# Patient Record
Sex: FEMALE | Race: BLACK OR AFRICAN AMERICAN | NOT HISPANIC OR LATINO | Employment: OTHER | URBAN - METROPOLITAN AREA
[De-identification: names, ages, dates, MRNs, and addresses within clinical notes are randomized per-mention and may not be internally consistent; named-entity substitution may affect disease eponyms.]

---

## 2018-01-05 ENCOUNTER — TELEPHONE (OUTPATIENT)
Dept: TRANSPLANT | Facility: CLINIC | Age: 63
End: 2018-01-05

## 2018-01-05 NOTE — TELEPHONE ENCOUNTER
"----- Message from Tracy Gonzalez sent at 1/5/2018  3:44 PM CST -----  We have the pt recorders and they are now pending review by the referral nurse. Also updated her demo info. Pt ls request that we not call her with appt, "just anju'ed them and send it in the mail" but will need atleast a 2wk notice    By:Tracy Gonzalez    "

## 2018-01-11 ENCOUNTER — TELEPHONE (OUTPATIENT)
Dept: TRANSPLANT | Facility: CLINIC | Age: 63
End: 2018-01-11

## 2018-01-11 NOTE — LETTER
January 11, 2018    Glory Chawla  [address] [address]  [place] [address]      Dear Glory Chawla:    Your doctor has referred you to the [facility]. You will be contacted by our office and an initial appointment will then be scheduled for you.    We look forward to seeing you soon. If you have any further questions, please contact us at [phone].       Sincerely,        [facility]   [facility] [place] [address]  [phone]

## 2018-01-11 NOTE — TELEPHONE ENCOUNTER
Pt records reviewed.  Pt will be referred to Hepatology due to cirrhosis with small 11 mm x 8mm. compensated cirrhosis, low MELD, CP A, secondary to Hep C (s/p SVR with Harvoni) with a new small HCC (8 x 11mm).   Initial referral received  from  Gene Coleman's  office.   Referral letter sent to provider and patient.  Pt records reviewed.    Emailed referring re need for CD and lvm with pt for CD to be brought to clinic.

## 2018-02-09 ENCOUNTER — LAB VISIT (OUTPATIENT)
Dept: LAB | Facility: HOSPITAL | Age: 63
End: 2018-02-09
Attending: INTERNAL MEDICINE
Payer: MEDICAID

## 2018-02-09 ENCOUNTER — OFFICE VISIT (OUTPATIENT)
Dept: HEPATOLOGY | Facility: CLINIC | Age: 63
End: 2018-02-09
Payer: MEDICAID

## 2018-02-09 VITALS
DIASTOLIC BLOOD PRESSURE: 78 MMHG | RESPIRATION RATE: 18 BRPM | HEIGHT: 63 IN | OXYGEN SATURATION: 95 % | TEMPERATURE: 96 F | SYSTOLIC BLOOD PRESSURE: 149 MMHG | WEIGHT: 167.13 LBS | HEART RATE: 84 BPM | BODY MASS INDEX: 29.61 KG/M2

## 2018-02-09 DIAGNOSIS — C22.0 HCC (HEPATOCELLULAR CARCINOMA): ICD-10-CM

## 2018-02-09 DIAGNOSIS — R16.0 LIVER MASS: ICD-10-CM

## 2018-02-09 DIAGNOSIS — K74.69 COMPENSATED CIRRHOSIS RELATED TO HEPATITIS C VIRUS (HCV): ICD-10-CM

## 2018-02-09 DIAGNOSIS — B19.20 COMPENSATED CIRRHOSIS RELATED TO HEPATITIS C VIRUS (HCV): Primary | ICD-10-CM

## 2018-02-09 DIAGNOSIS — K74.69 COMPENSATED CIRRHOSIS RELATED TO HEPATITIS C VIRUS (HCV): Primary | ICD-10-CM

## 2018-02-09 DIAGNOSIS — B19.20 COMPENSATED CIRRHOSIS RELATED TO HEPATITIS C VIRUS (HCV): ICD-10-CM

## 2018-02-09 LAB
AFP SERPL-MCNC: 9 NG/ML
ALBUMIN SERPL BCP-MCNC: 3.5 G/DL
ALP SERPL-CCNC: 91 U/L
ALT SERPL W/O P-5'-P-CCNC: 65 U/L
ANION GAP SERPL CALC-SCNC: 8 MMOL/L
AST SERPL-CCNC: 68 U/L
BILIRUB SERPL-MCNC: 0.2 MG/DL
BUN SERPL-MCNC: 21 MG/DL
CALCIUM SERPL-MCNC: 9.5 MG/DL
CHLORIDE SERPL-SCNC: 105 MMOL/L
CO2 SERPL-SCNC: 26 MMOL/L
CREAT SERPL-MCNC: 1 MG/DL
ERYTHROCYTE [DISTWIDTH] IN BLOOD BY AUTOMATED COUNT: 13.2 %
EST. GFR  (AFRICAN AMERICAN): >60 ML/MIN/1.73 M^2
EST. GFR  (NON AFRICAN AMERICAN): >60 ML/MIN/1.73 M^2
GLUCOSE SERPL-MCNC: 91 MG/DL
HCT VFR BLD AUTO: 35.9 %
HGB BLD-MCNC: 12.5 G/DL
INR PPP: 1.1
MCH RBC QN AUTO: 34.7 PG
MCHC RBC AUTO-ENTMCNC: 34.8 G/DL
MCV RBC AUTO: 100 FL
PLATELET # BLD AUTO: 114 K/UL
PMV BLD AUTO: 11.9 FL
POTASSIUM SERPL-SCNC: 4.4 MMOL/L
PROT SERPL-MCNC: 7.8 G/DL
PROTHROMBIN TIME: 11.5 SEC
RBC # BLD AUTO: 3.6 M/UL
SODIUM SERPL-SCNC: 139 MMOL/L
WBC # BLD AUTO: 5.82 K/UL

## 2018-02-09 PROCEDURE — 3008F BODY MASS INDEX DOCD: CPT | Mod: ,,, | Performed by: INTERNAL MEDICINE

## 2018-02-09 PROCEDURE — 99214 OFFICE O/P EST MOD 30 MIN: CPT | Mod: PBBFAC | Performed by: INTERNAL MEDICINE

## 2018-02-09 PROCEDURE — 80321 ALCOHOLS BIOMARKERS 1OR 2: CPT

## 2018-02-09 PROCEDURE — 82105 ALPHA-FETOPROTEIN SERUM: CPT

## 2018-02-09 PROCEDURE — 85610 PROTHROMBIN TIME: CPT

## 2018-02-09 PROCEDURE — 87340 HEPATITIS B SURFACE AG IA: CPT

## 2018-02-09 PROCEDURE — 99204 OFFICE O/P NEW MOD 45 MIN: CPT | Mod: S$PBB,,, | Performed by: INTERNAL MEDICINE

## 2018-02-09 PROCEDURE — 87536 HIV-1 QUANT&REVRSE TRNSCRPJ: CPT

## 2018-02-09 PROCEDURE — 86706 HEP B SURFACE ANTIBODY: CPT

## 2018-02-09 PROCEDURE — 80053 COMPREHEN METABOLIC PANEL: CPT

## 2018-02-09 PROCEDURE — 87522 HEPATITIS C REVRS TRNSCRPJ: CPT

## 2018-02-09 PROCEDURE — 36415 COLL VENOUS BLD VENIPUNCTURE: CPT

## 2018-02-09 PROCEDURE — 86803 HEPATITIS C AB TEST: CPT

## 2018-02-09 PROCEDURE — 85027 COMPLETE CBC AUTOMATED: CPT

## 2018-02-09 PROCEDURE — 99999 PR PBB SHADOW E&M-EST. PATIENT-LVL IV: CPT | Mod: PBBFAC,,, | Performed by: INTERNAL MEDICINE

## 2018-02-09 RX ORDER — DOLUTEGRAVIR SODIUM 50 MG/1
50 TABLET, FILM COATED ORAL DAILY
Refills: 5 | COMMUNITY
Start: 2018-02-06

## 2018-02-09 RX ORDER — LANSOPRAZOLE 15 MG/1
15 CAPSULE, DELAYED RELEASE ORAL DAILY
COMMUNITY
Start: 2018-02-08 | End: 2020-05-27

## 2018-02-09 RX ORDER — EMTRICITABINE AND TENOFOVIR ALAFENAMIDE 200; 25 MG/1; MG/1
200 TABLET ORAL DAILY
Refills: 5 | Status: ON HOLD | COMMUNITY
Start: 2018-02-06 | End: 2018-04-06 | Stop reason: ALTCHOICE

## 2018-02-09 RX ORDER — HYDROCODONE BITARTRATE AND ACETAMINOPHEN 7.5; 325 MG/1; MG/1
7.5 TABLET ORAL
COMMUNITY
Start: 2018-01-29 | End: 2019-01-04 | Stop reason: ALTCHOICE

## 2018-02-09 NOTE — PATIENT INSTRUCTIONS
- will schedule blood tests today  - will schedule MRI of liver   - then Dr. Whitley will review your MRI at Radiology Conference and call you with the result  - please stop alcohol completely

## 2018-02-09 NOTE — LETTER
February 9, 2018      Virginia Mills MD  [address]  [facility] [address]           [facility] - Hepatology  [address]  [facility] [address]  Phone: [phone]  Fax: [phone]          Patient: Glory Chawla   MR Number: [id number]   YOB: 1955   Date of Visit: 2/9/2018       Dear Dr. Virginia Mills:    Thank you for referring Glory Chawla to me for evaluation. Attached you will find relevant portions of my assessment and plan of care.     62 y.o. female presenting with     1. Compensated cirrhosis related to hepatitis C virus (HCV)    2. Liver mass    3. HCC (hepatocellular carcinoma)      HIV+ patient on ARV, HCV - treated with SOF/LDV in 2014 and SVR since.  Compensated cirrhosis.  1 cm lesion in the liver - [facility] - HCC per patient (biopsy report n/a). CD for outside images: n/a.  Last imaging was in Oct 2017.  If HCC is 1 cm, ablation would be a curative, least invasive option.    Recommendation(s):  - repeat labs and imaging CT 3-phase  - recommend to stop drinking alcohol completely  - will review images at Liver Imaging Conference    If you have questions, please do not hesitate to call me. I look forward to following Glory Chawla along with you.    Sincerely,    Lida Whitley MD    Enclosure  CC:  No Recipients    If you would like to receive this communication electronically, please contact [email or web address] or [phone] to request more information on [employer] access.    For providers and/or their staff who would like to refer a patient to [facility], please contact us through our one-stop-shop provider referral line, [facility] at [phone].    If you feel you have received this communication in error or would no longer like to receive these types of communications, please e-mail [email or web address]

## 2018-02-09 NOTE — PROGRESS NOTES
Hepatology Consult Note    Referring provider: Dr. Virginia Mills      Chief complaint:   Chief Complaint   Patient presents with    Cirrhosis    Hepatocellular Carcinoma    Hepatitis C     SVR       HPI:  Glory Chawla is a pleasant 62 y.o. femalewho was referred to Hepatology Clinic for consultation of had concerns including Cirrhosis; Hepatocellular Carcinoma; and Hepatitis C (SVR)..      Background   She was diagnosed with HCV in 2013, treated with SOF/LDV in 2014 and SVR since. She sees Dr. Mills at [facility]. In Oct 2017, she was diagnosed 1 cm liver lesion, concerning for HCC. She had a targeted liver biopsy and confirmed for HCC (report n/a).   She was then referred to [facility].  Patient was supposed to bring outside imaging CDs . Patient was extremely upset about referring physician not sending records. She was frustrated and yelled and then became tearful. She is very concerned of her liver cancer.    She denies any decompensation of liver disease. She c/o right flank pain.    Hepatitis C:  Genotype: unknown  Treated with SOF/LDV x ? Duration in 2014 and SVR    Liver biopsy: n/a      Alcohol: used to, 1-2 beer every now and then, 1-2 beer, never been a heavy drinker.  Tobacco: 5 cigarettes/day, on Chantix.   Marijuana: no  IVDU: no, 40 years ago  Cocaine: no  Tattoo: no   Blood transfusion: no    PMH: HIV+, on antiretrovial    PSH: no abdominal surgeries      Liver disease:                   Hepatitis C    MELD-Na:                         n/a  Child-Taylor Class:             A (per referring physician)    Transplant status:             not under evaluation    Cirrhosis is compensated with:    Ascites:                                                      no  Spontaneous bacterial peritonitis:              no  Hepatic Encephalopathy:                           no  Portal bleeding:                                           no  Hepatocellular carcinoma:                          no    Hepatorenal  syndrome:                              no  Hyponatremia:                                             no  Muscle wasting:                                          no   Portal vein thrombosis:                               no      Screening:  Last EGD: n/a  Last imaging study: MRI 10/2/17: LIRAD 4 lesion.        Patient Active Problem List   Diagnosis    Compensated cirrhosis related to hepatitis C virus (HCV)    Liver mass       Past Medical History:   Diagnosis Date    Hepatitis C     HIV (human immunodeficiency virus infection)     Hypertension        Past Surgical History:   Procedure Laterality Date    KNEE SURGERY      Skin grafts         Family History   Problem Relation Age of Onset    Depression Brother        Social History     Social History    Marital status: Single     Spouse name: N/A    Number of children: N/A    Years of education: N/A     Social History Main Topics    Smoking status: Current Every Day Smoker     Packs/day: 0.15     Types: Cigarettes    Smokeless tobacco: Not on file    Alcohol use Yes    Drug use: No    Sexual activity: Not on file     Other Topics Concern    Not on file     Social History Narrative    No narrative on file       Current Outpatient Prescriptions   Medication Sig Dispense Refill    DESCOVY 200-25 mg Tab Take 200 tablets by mouth once daily.  5    hydrocodone-acetaminophen 7.5-325mg (NORCO) 7.5-325 mg per tablet Take 7.5 tablets by mouth as needed.      methocarbamol (ROBAXIN) 500 MG Tab 1 tab by mouth 3 times a day when necessary muscle relaxation 30 tablet 0    MULTIVITAMINS,THER W-MINERALS (THERADEX-M ORAL) Take by mouth.      PREVACID 24HR 15 mg capsule Take 15 mg by mouth once daily.      TIVICAY 50 mg Tab Take 50 mg by mouth once daily.  5    raltegravir (ISENTRESS) 400 mg tablet Take 400 mg by mouth 2 (two) times daily.       No current facility-administered medications for this visit.        Review of patient's allergies indicates:  No  "Known Allergies    Review of Systems   Constitutional: Negative for chills, fever, malaise/fatigue and weight loss.   HENT: Negative for congestion, nosebleeds and sore throat.    Eyes: Negative for blurred vision, double vision and photophobia.   Respiratory: Negative for cough and shortness of breath.    Cardiovascular: Negative for chest pain, palpitations, orthopnea and leg swelling.   Gastrointestinal: Negative for abdominal pain, blood in stool, constipation, diarrhea, melena and vomiting.   Genitourinary: Negative for dysuria.   Musculoskeletal: Positive for myalgias. Negative for falls and joint pain.   Skin: Negative for itching and rash.   Neurological: Negative for dizziness, tremors and weakness.   Endo/Heme/Allergies: Does not bruise/bleed easily.   Psychiatric/Behavioral: Negative for depression and substance abuse. The patient is not nervous/anxious and does not have insomnia.        Vitals:    02/09/18 1539   BP: (!) 149/78   Pulse: 84   Resp: 18   Temp: 96.4 °F (35.8 °C)   TempSrc: Oral   SpO2: 95%   Weight: 75.8 kg (167 lb 1.7 oz)   Height: 5' 3" (1.6 m)       Physical Exam   Constitutional: She is oriented to person, place, and time. She appears well-developed and well-nourished.   HENT:   Head: Normocephalic and atraumatic.   Mouth/Throat: Oropharynx is clear and moist.   Eyes: Conjunctivae and EOM are normal. Pupils are equal, round, and reactive to light. No scleral icterus.   Neck: Normal range of motion.   Cardiovascular: Normal rate, regular rhythm and normal heart sounds.    No murmur heard.  Pulmonary/Chest: Effort normal and breath sounds normal. No respiratory distress. She has no rales.   Abdominal: Soft. Bowel sounds are normal. She exhibits no distension and no mass. There is no tenderness. There is no guarding. No hernia.   Musculoskeletal: She exhibits no edema.   Lymphadenopathy:     She has no cervical adenopathy.   Neurological: She is alert and oriented to person, place, and " time.   Skin: Skin is warm and dry. No rash noted.   Psychiatric: She has a normal mood and affect. Her behavior is normal. Her mood appears not anxious.   Nursing note and vitals reviewed.      LABS: I personally reviewed pertinent laboratory findings.    Lab Results   Component Value Date    ALT 81 (H) 09/08/2013    AST 85 (H) 09/08/2013    ALKPHOS 158 (H) 09/08/2013    BILITOT 0.6 09/08/2013       Lab Results   Component Value Date    WBC 2.91 (L) 09/08/2013    HGB 12.2 09/08/2013    HCT 34.8 (L) 09/08/2013    MCV 99 (H) 09/08/2013    PLT 81 (L) 09/08/2013       Lab Results   Component Value Date     09/08/2013    K 4.6 09/08/2013     09/08/2013    CO2 23 09/08/2013    BUN 9 09/08/2013    CREATININE 1.0 09/08/2013    CALCIUM 9.3 09/08/2013    ANIONGAP 6 (L) 09/08/2013    ESTGFRAFRICA >60 09/08/2013    EGFRNONAA >60 09/08/2013       No results found for: HAV, HEPAIGM, HEPBIGM, HEPBCAB, HBEAG, HEPCAB    No results found for: SMOOTHMUSCAB, MITOAB    I personally reviewed all recent lab results.  I personally reviewed imaging studies.    Assessment:  62 y.o. female presenting with     1. Compensated cirrhosis related to hepatitis C virus (HCV)    2. Liver mass    3. HCC (hepatocellular carcinoma)      HIV+ patient on ARV, HCV - treated with SOF/LDV in 2014 and SVR since.  Compensated cirrhosis.  1 cm lesion in the liver - targeted biopsy at [facility] - HCC per patient (biopsy report n/a). CD for outside images: n/a.  Last imaging was in Oct 2017.  If HCC is 1 cm, ablation would be a curative, least invasive option.    Recommendation(s):  - repeat labs and imaging CT 3-phase  - recommend to stop drinking alcohol completely  - will review images at Liver Imaging Conference    A total of 60 minutes were spent face-to-face with the patient during this encounter and over half of that time was spent on counseling and coordination of care.  We discussed in depth the nature of the patient's liver disease, the  management plan in details. I also educated the patient about lifestyle modifications which may improve hepatic steatosis, overweight/obesity, insulin resistance and high blood pressure issues. I have provided the patient with an opportunity to ask questions and have all questions answered to his satisfaction.     I have sent communication to the referring physician and/or primary care provider.    Lida Whitley MD  Staff Physician  Hepatology and Liver Transplant  [facility]  [facility]

## 2018-02-12 ENCOUNTER — TELEPHONE (OUTPATIENT)
Dept: HEPATOLOGY | Facility: CLINIC | Age: 63
End: 2018-02-12

## 2018-02-12 LAB
HBV SURFACE AB SER-ACNC: NEGATIVE M[IU]/ML
HBV SURFACE AG SERPL QL IA: NEGATIVE
HCV AB SERPL QL IA: POSITIVE
HCV LOG: <1.08 LOG (10) IU/ML
HCV RNA QUANT PCR: <12 IU/ML
HCV, QUALITATIVE: NOT DETECTED IU/ML
HIV UQ DATE RECEIVED: ABNORMAL
HIV UQ DATE REPORTED: ABNORMAL
HIV1 RNA # SERPL NAA+PROBE: <40 COPIES/ML
HIV1 RNA SERPL NAA+PROBE-LOG#: <1.6 LOG (10) COPIES/ML
HIV1 RNA SERPL QL NAA+PROBE: DETECTED

## 2018-02-12 NOTE — TELEPHONE ENCOUNTER
Faxed request to [facility] on 02/12.  · Fax #: [phone]  · Phone #: [phone]  Requested patient's MRI, CT scans, and pathology (liver biopsy) report be sent to us ASAP.  [employer] info given.

## 2018-02-15 LAB — PHOSPHATIDYLETHANOL (PETH): 462 NG/ML

## 2018-02-27 ENCOUNTER — HOSPITAL ENCOUNTER (OUTPATIENT)
Dept: RADIOLOGY | Facility: HOSPITAL | Age: 63
Discharge: HOME | End: 2018-02-27
Attending: INTERNAL MEDICINE
Payer: MEDICAID

## 2018-02-27 DIAGNOSIS — R16.0 LIVER MASS: ICD-10-CM

## 2018-02-27 DIAGNOSIS — C22.0 HCC (HEPATOCELLULAR CARCINOMA): ICD-10-CM

## 2018-02-27 DIAGNOSIS — K74.69 COMPENSATED CIRRHOSIS RELATED TO HEPATITIS C VIRUS (HCV): ICD-10-CM

## 2018-02-27 DIAGNOSIS — B19.20 COMPENSATED CIRRHOSIS RELATED TO HEPATITIS C VIRUS (HCV): ICD-10-CM

## 2018-02-27 PROCEDURE — 76376 3D RENDER W/INTRP POSTPROCES: CPT | Mod: 26,,, | Performed by: RADIOLOGY

## 2018-02-27 PROCEDURE — 25500020 PHARM REV CODE 255: Performed by: INTERNAL MEDICINE

## 2018-02-27 PROCEDURE — A9585 GADOBUTROL INJECTION: HCPCS | Performed by: INTERNAL MEDICINE

## 2018-02-27 PROCEDURE — 74183 MRI ABD W/O CNTR FLWD CNTR: CPT | Mod: 26,,, | Performed by: RADIOLOGY

## 2018-02-27 PROCEDURE — 76376 3D RENDER W/INTRP POSTPROCES: CPT | Mod: TC

## 2018-02-27 RX ORDER — GADOBUTROL 604.72 MG/ML
10 INJECTION INTRAVENOUS
Status: COMPLETED | OUTPATIENT
Start: 2018-02-27 | End: 2018-02-27

## 2018-02-27 RX ADMIN — GADOBUTROL 10 ML: 604.72 INJECTION INTRAVENOUS at 03:02

## 2018-02-28 ENCOUNTER — TELEPHONE (OUTPATIENT)
Dept: TRANSPLANT | Facility: CLINIC | Age: 63
End: 2018-02-28

## 2018-02-28 NOTE — TELEPHONE ENCOUNTER
Patient: Glory Chawla       MRN: [id number]      : 1955     Age: 62 y.o.  [address]  [address]  [place] [address]    Provider: Hepatologist Wayne Whitley    Urgency of review: non-urgent    Patient Transplant Status: Other indeterminante    Reason for presentation: Indeterminate lesion    Clinical Summary:   62 HCV/ETOH cirrhosis, Child A, HCV s/p treatment with SVR, HIV+ on ARV. Patient reported she was diagnosed with HCC at [facility] (pt did not bring any report or CD s).   MRI: 1 cm lesion with enhancement but no wash out  Plt: 114, Korey: 0.2m, AFP: 9.    Imaging to be reviewed: MRI. Patient did not bring old CD .    HCC Treatment History:n/a    ABO: O POS    Platelets:   Lab Results   Component Value Date/Time     (L) 2018 05:09 PM     Creatinine:   Lab Results   Component Value Date/Time    CREATININE 1.0 2018 05:09 PM     Bilirubin:   Lab Results   Component Value Date/Time    BILITOT 0.2 2018 05:09 PM     AFP Last 3 each if available:   Lab Results   Component Value Date/Time    AFP 9.0 (H) 2018 05:09 PM       MELD: MELD-Na score: 7 at 2018  5:09 PM  MELD score: 7 at 2018  5:09 PM  Calculated from:  Serum Creatinine: 1.0 mg/dL at 2018  5:09 PM  Serum Sodium: 139 mmol/L (Rounded to 137) at 2018  5:09 PM  Total Bilirubin: 0.2 mg/dL (Rounded to 1) at 2018  5:09 PM  INR(ratio): 1.1 at 2018  5:09 PM  Age: 62 years    Plan:     Follow-up Provider:

## 2018-03-01 ENCOUNTER — TELEPHONE (OUTPATIENT)
Dept: HEPATOLOGY | Facility: CLINIC | Age: 63
End: 2018-03-01

## 2018-03-01 NOTE — TELEPHONE ENCOUNTER
Called [facility] Medical Records again since request was faxed 02/12. Was informed that their system is currently down so they can't even check on the status right now. Will fax request again.

## 2018-03-01 NOTE — TELEPHONE ENCOUNTER
Records received from [facility].    [facility] liver biopsy 12/14/17: liver right lobe, segment 5, ultrasound-guided fine needle aspiration. Cytologic pattern consistent with hepatocellular carcinoma (HCC).    CT abd/pelvis ( 7/25/17)  - 1 x 1.1 cm arterially enhancing mass in segment 5.

## 2018-03-01 NOTE — TELEPHONE ENCOUNTER
----- Message from Lida Whitley MD sent at 2/28/2018  5:33 PM CST -----  Have we received any records for this patient from [facility] ??

## 2018-03-06 ENCOUNTER — CONFERENCE (OUTPATIENT)
Dept: TRANSPLANT | Facility: CLINIC | Age: 63
End: 2018-03-06

## 2018-03-06 NOTE — TELEPHONE ENCOUNTER
Patient: Glory Chwala       MRN: [id number]      : 1955     Age: 62 y.o.  [address]  [address]  [place] [address]    Provider: Hepatologist Wayne Whitley    Urgency of review: non-urgent    Patient Transplant Status: Other indeterminante    Reason for presentation: Indeterminate lesion    Clinical Summary:   62 HCV/ETOH cirrhosis, Child A, HCV s/p treatment with SVR, HIV+ on ARV. Patient reported she was diagnosed with HCC at [facility] (pt did not bring any report or CD s).   MRI: 1 cm lesion with enhancement but no wash out  Plt: 114, Korey: 0.2m, AFP: 9.    Imaging to be reviewed: MRI. Patient did not bring old CD .    HCC Treatment History:n/a    ABO: O POS    Platelets:   Lab Results   Component Value Date/Time     (L) 2018 05:09 PM     Creatinine:   Lab Results   Component Value Date/Time    CREATININE 1.0 2018 05:09 PM     Bilirubin:   Lab Results   Component Value Date/Time    BILITOT 0.2 2018 05:09 PM     AFP Last 3 each if available:   Lab Results   Component Value Date/Time    AFP 9.0 (H) 2018 05:09 PM       MELD: MELD-Na score: 7 at 2018  5:09 PM  MELD score: 7 at 2018  5:09 PM  Calculated from:  Serum Creatinine: 1.0 mg/dL at 2018  5:09 PM  Serum Sodium: 139 mmol/L (Rounded to 137) at 2018  5:09 PM  Total Bilirubin: 0.2 mg/dL (Rounded to 1) at 2018  5:09 PM  INR(ratio): 1.1 at 2018  5:09 PM  Age: 62 years    Plan: 1 cm lesion is indeterminate but suspicious (biopsy proven HCC).  Continue surveillance.   Dr. Whitley to speak with patient about treatment options.  If transplant candidate, repeat imaging in 3 months.  If not a transplant candidate, will proceed with IR consult for treatment     Lelia Hylton LPN, notified via this communication    Follow-up Provider:

## 2018-03-09 ENCOUNTER — TELEPHONE (OUTPATIENT)
Dept: HEPATOLOGY | Facility: CLINIC | Age: 63
End: 2018-03-09

## 2018-03-09 ENCOUNTER — CLINICAL SUPPORT (OUTPATIENT)
Dept: INFECTIOUS DISEASES | Facility: CLINIC | Age: 63
End: 2018-03-09
Payer: MEDICAID

## 2018-03-09 ENCOUNTER — OFFICE VISIT (OUTPATIENT)
Dept: HEPATOLOGY | Facility: CLINIC | Age: 63
End: 2018-03-09
Payer: MEDICAID

## 2018-03-09 VITALS
HEART RATE: 106 BPM | RESPIRATION RATE: 18 BRPM | HEIGHT: 63 IN | BODY MASS INDEX: 28.79 KG/M2 | SYSTOLIC BLOOD PRESSURE: 129 MMHG | WEIGHT: 162.5 LBS | TEMPERATURE: 99 F | DIASTOLIC BLOOD PRESSURE: 90 MMHG | OXYGEN SATURATION: 99 %

## 2018-03-09 DIAGNOSIS — R16.0 LIVER MASS: ICD-10-CM

## 2018-03-09 DIAGNOSIS — K74.69 COMPENSATED CIRRHOSIS RELATED TO HEPATITIS C VIRUS (HCV): ICD-10-CM

## 2018-03-09 DIAGNOSIS — B19.20 COMPENSATED CIRRHOSIS RELATED TO HEPATITIS C VIRUS (HCV): ICD-10-CM

## 2018-03-09 DIAGNOSIS — C22.0 HCC (HEPATOCELLULAR CARCINOMA): Primary | ICD-10-CM

## 2018-03-09 PROCEDURE — 99214 OFFICE O/P EST MOD 30 MIN: CPT | Mod: PBBFAC,27,25 | Performed by: INTERNAL MEDICINE

## 2018-03-09 PROCEDURE — 99211 OFF/OP EST MAY X REQ PHY/QHP: CPT | Mod: PBBFAC

## 2018-03-09 PROCEDURE — 90471 IMMUNIZATION ADMIN: CPT | Mod: PBBFAC

## 2018-03-09 PROCEDURE — 99999 PR PBB SHADOW E&M-EST. PATIENT-LVL IV: CPT | Mod: PBBFAC,,, | Performed by: INTERNAL MEDICINE

## 2018-03-09 PROCEDURE — 99215 OFFICE O/P EST HI 40 MIN: CPT | Mod: S$PBB,,, | Performed by: INTERNAL MEDICINE

## 2018-03-09 PROCEDURE — 99999 PR PBB SHADOW E&M-EST. PATIENT-LVL I: CPT | Mod: PBBFAC,,,

## 2018-03-09 NOTE — TELEPHONE ENCOUNTER
Received disc from [facility] in the mail and brought to 2nd floor IR for upload to patient records (not IR, per Dr. Whitley).

## 2018-03-09 NOTE — PATIENT INSTRUCTIONS
- please schedule Interventional Radiology for liver cancer treatment  - please start Hepatitis B immunization series  - please stop drinking alcohol completely   - return in 3 months      Understanding Cirrhosis    Cirrhosis is a chronic (lifelong) liver problem. It results from damaged and scarred liver tissue. Cirrhosis cant be cured. But it can be treated. Your healthcare provider can tell you more.  The liver  The liver is a large organ in the upper right part of the belly. A healthy liver metabolizes proteins, carbohydrates, and fats. It makes a digestive fluid called bile and removes toxins from the blood. The liver is also involved in the blood-clotting process.  When you have cirrhosis  When you have cirrhosis, your liver becomes damaged and scarred. The liver doesnt function as it should. In some cases, cirrhosis can lead to liver failure. If it does, your healthcare provider will tell you whether you may need a liver transplant. You can slow down the progression of cirrhosis if you stop all alcohol use. Also, if you have metabolic problems like being overweight, diabetes, high blood pressure, or high cholesterol and triglycerides, you can also slow down the progression of cirrhosis by trying to improve those diseases.   Causes of cirrhosis  Cirrhosis causes include the following:  · Alcohol use  · Viral liver infections, such as hepatitis  · Chronic bile duct blockage  · Certain inherited diseases that can result in too much copper or iron being stored in the liver  · Certain medicines  · Nonalcoholic fatty liver disease  · Autoimmune disease  Common signs and symptoms  Typical cirrhosis signs and symptoms include the following:  · Fatigue, weakness, and lack of appetite  · Vomiting with or without blood  · Weight loss or weight gain  · Yellowish skin and eyes (jaundice)  · Itching  · Swollen belly and legs  · Intestinal bleeding  · Easy bruising of the skin  · Dilated veins in the esophagus and  stomach  · Poor mental function  Date Last Reviewed: 6/1/2016  © 6499-6263 [employer]. [address] [place] [address], [place] [address]. All rights reserved. This information is not intended as a substitute for professional medical care. Always follow your healthcare professional's instructions.

## 2018-03-09 NOTE — PROGRESS NOTES
Hepatology Consult Note    Referring provider: Gene Coleman MD ([facility] - [facility])    Chief complaint:   HCC/Hepatitis C cirrhosis    HPI:  Glory Chawla is a pleasant 62 y.o. femalewho was referred to Hepatology Clinic for consultation of had no chief complaint listed for this encounter..      Follow up:  3/9/18: Feels well otherwise, just anxious to know her MRI report and IR conference recommendations.  As regards to liver disease,  - The patient reports no symptoms of hepatitis including malaise or flu-like symptoms to suggest a flare.  - The patient reports no new manifestations of portal hypertension including ascites, edema, GI bleeding, or hepatic encephalopathy to suggest liver decompensation.  - The patient reports no fevers/chills or pruritis to suggest biliary disease.    3/7/18 IR conference:  Plan: 1 cm lesion is indeterminate but suspicious (biopsy proven HCC).  Continue surveillance.   Dr. Whitley to speak with patient about treatment options.  If transplant candidate, repeat imaging in 3 months.  If not a transplant candidate, will proceed with IR consult for treatment     Hepatitis C:  Genotype: unknown  Treated with SOF/LDV x ? Duration in 2014 and SVR    Liver biopsy: targeted liver mass biopsy [facility] - c/w HCC      Alcohol: used to, 1-2 beer every now and then, 1-2 beer, never been a heavy drinker.  Tobacco: 5 cigarettes/day, on Chantix.   Marijuana: no  IVDU: no, 40 years ago  Cocaine: no  Tattoo: no   Blood transfusion: no    PMH: HIV+, on antiretrovial    PSH: no abdominal surgeries      Liver disease:                   Hepatitis C    MELD-Na:                         n/a  Child-Taylor Class:             A (per referring physician)    Transplant status:             not under evaluation    Cirrhosis is compensated with:    Ascites:                                                      no  Spontaneous bacterial peritonitis:              no  Hepatic Encephalopathy:                            no  Portal bleeding:                                           no  Hepatocellular carcinoma:                          no    Hepatorenal syndrome:                              no  Hyponatremia:                                             no  Muscle wasting:                                          no   Portal vein thrombosis:                               no      Screening:  Last EGD: n/a  Last imaging study: MRI 10/2/17: LIRAD 4 lesion.    MRI 2/2018 at [facility]: 1 cm LIRAD 4 but biopsy at [facility] was c/w HCC.    Lab Results   Component Value Date    AFP 9.0 (H) 02/09/2018       Background   She was diagnosed with HCV in 2013, treated with SOF/LDV in 2014 and SVR since. She sees Dr. Mills at [facility]. In Oct 2017, she was diagnosed 1 cm liver lesion, concerning for HCC. She had a targeted liver biopsy and confirmed for HCC (report n/a).   She was then referred to [facility].  Patient was supposed to bring outside imaging CDs . Patient was extremely upset about referring physician not sending records. She was frustrated and yelled and then became tearful. She is very concerned of her liver cancer.    She denies any decompensation of liver disease. She c/o right flank pain.    Patient Active Problem List   Diagnosis    Compensated cirrhosis related to hepatitis C virus (HCV)    Liver mass    HCC (hepatocellular carcinoma)       Past Medical History:   Diagnosis Date    Hepatitis C     HIV (human immunodeficiency virus infection)     Hypertension        Past Surgical History:   Procedure Laterality Date    KNEE SURGERY      Skin grafts         Family History   Problem Relation Age of Onset    Depression Brother        Social History     Social History    Marital status: Single     Spouse name: N/A    Number of children: N/A    Years of education: N/A     Social History Main Topics    Smoking status: Current Every Day Smoker     Packs/day: 0.15     Types: Cigarettes    Smokeless tobacco: Not on file     Alcohol use Yes    Drug use: No    Sexual activity: Not on file     Other Topics Concern    Not on file     Social History Narrative    No narrative on file       Current Outpatient Prescriptions   Medication Sig Dispense Refill    DESCOVY 200-25 mg Tab Take 200 tablets by mouth once daily.  5    hydrocodone-acetaminophen 7.5-325mg (NORCO) 7.5-325 mg per tablet Take 7.5 tablets by mouth as needed.      methocarbamol (ROBAXIN) 500 MG Tab 1 tab by mouth 3 times a day when necessary muscle relaxation 30 tablet 0    MULTIVITAMINS,THER W-MINERALS (THERADEX-M ORAL) Take by mouth.      PREVACID 24HR 15 mg capsule Take 15 mg by mouth once daily.      raltegravir (ISENTRESS) 400 mg tablet Take 400 mg by mouth 2 (two) times daily.      TIVICAY 50 mg Tab Take 50 mg by mouth once daily.  5     No current facility-administered medications for this visit.        Review of patient's allergies indicates:  No Known Allergies    Review of Systems   Constitutional: Negative for chills, fever, malaise/fatigue and weight loss.   HENT: Negative for congestion, nosebleeds and sore throat.    Eyes: Negative for blurred vision, double vision and photophobia.   Respiratory: Negative for cough and shortness of breath.    Cardiovascular: Negative for chest pain, palpitations, orthopnea and leg swelling.   Gastrointestinal: Negative for abdominal pain, blood in stool, constipation, diarrhea, melena and vomiting.   Genitourinary: Negative for dysuria.   Musculoskeletal: Positive for myalgias. Negative for falls and joint pain.   Skin: Negative for itching and rash.   Neurological: Negative for dizziness, tremors and weakness.   Endo/Heme/Allergies: Does not bruise/bleed easily.   Psychiatric/Behavioral: Negative for depression and substance abuse. The patient is not nervous/anxious and does not have insomnia.        Vitals:    03/09/18 0954   BP: (!) 129/90   Pulse: 106   Resp: 18   Temp: 98.9 °F (37.2 °C)   TempSrc: Oral   SpO2: 99%  "  Weight: 73.7 kg (162 lb 7.7 oz)   Height: 5' 3" (1.6 m)       Physical Exam   Constitutional: She is oriented to person, place, and time. She appears well-developed and well-nourished.   HENT:   Head: Normocephalic and atraumatic.   Mouth/Throat: Oropharynx is clear and moist.   Eyes: Conjunctivae and EOM are normal. Pupils are equal, round, and reactive to light. No scleral icterus.   Neck: Normal range of motion.   Cardiovascular: Normal rate, regular rhythm and normal heart sounds.    No murmur heard.  Pulmonary/Chest: Effort normal and breath sounds normal. No respiratory distress. She has no rales.   Abdominal: Soft. Bowel sounds are normal. She exhibits no distension and no mass. There is no tenderness. There is no guarding. No hernia.   Musculoskeletal: She exhibits no edema.   Lymphadenopathy:     She has no cervical adenopathy.   Neurological: She is alert and oriented to person, place, and time.   Skin: Skin is warm and dry. No rash noted.   Psychiatric: She has a normal mood and affect. Her behavior is normal. Her mood appears not anxious.   Nursing note and vitals reviewed.      LABS: I personally reviewed pertinent laboratory findings.    Lab Results   Component Value Date    ALT 65 (H) 02/09/2018    AST 68 (H) 02/09/2018    ALKPHOS 91 02/09/2018    BILITOT 0.2 02/09/2018       Lab Results   Component Value Date    WBC 5.82 02/09/2018    HGB 12.5 02/09/2018    HCT 35.9 (L) 02/09/2018     (H) 02/09/2018     (L) 02/09/2018       Lab Results   Component Value Date     02/09/2018    K 4.4 02/09/2018     02/09/2018    CO2 26 02/09/2018    BUN 21 02/09/2018    CREATININE 1.0 02/09/2018    CALCIUM 9.5 02/09/2018    ANIONGAP 8 02/09/2018    ESTGFRAFRICA >60.0 02/09/2018    EGFRNONAA >60.0 02/09/2018       Lab Results   Component Value Date    HEPCAB Positive (A) 02/09/2018       No results found for: SMOOTHMUSCAB, MITOAB    I personally reviewed all recent lab results.  I personally " reviewed imaging studies.    Assessment:  62 y.o. female presenting with     1. HCC (hepatocellular carcinoma)    2. Compensated cirrhosis related to hepatitis C virus (HCV)         HIV+ patient on ARV, HCV - treated with SOF/LDV in 2014 and SVR since.  Compensated cirrhosis.  PETH: was positive.  No signs of portal hypertension.  1 cm lesion in the liver - targeted biopsy at [facility] - HCC.  Patient has chosen IR ablation for her HCC treatment, rather than waiting her HCC to grown and get liver transplant exception.  Low MELD, Child: A    Recommendation(s):  - IR consultation for ablation  - Hep B vaccination series  - complete cessation of alcohol (patient agrees with recommendations and states she will stop)  - return in 3 months with labs/PETH    Cirrhosis Counseling  - strict abstinence of alcohol use  - avoid non-steroidal anti-inflammatory drugs (NSAIDs) such as ibuprofen, Motrin, naprosyn, Alleve due to the risk of kidney damage  - can take acetaminophen (Tylenol), no more than 2000 mg per day  - low sodium (salt) 2 gram per day diet  - high protein diet: 1.2-1.5 gram/kg (protein per body weight in kilogram) per day to prevent muscle mass loss  - resistance exercises for muscle strength  - avoid raw seafoods due to the risk of fatal Vibrio vulnificus infection  - ultrasound or MRI of the liver every 6 months for liver cancer screening (you are at risk of developing liver cancer due to scar tissue in the liver)      A total of 60 minutes were spent face-to-face with the patient during this encounter and over half of that time was spent on counseling and coordination of care.  We discussed in depth the nature of the patient's liver disease, the management plan in details. I also educated the patient about lifestyle modifications which may improve hepatic steatosis, overweight/obesity, insulin resistance and high blood pressure issues. I have provided the patient with an opportunity to ask questions and have all  questions answered to his satisfaction.     I have sent communication to the referring physician and/or primary care provider.    Lida Whitley MD  Staff Physician  Hepatology and Liver Transplant  [facility]  [facility] [place]

## 2018-03-09 NOTE — LETTER
March 9, 2018        Virginia Mills MD  [address] [address]             [facility] - Hepatology  [address]  [facility] [address]  Phone: [phone]  Fax: [phone]   Patient: Glory Chawla   MR Number: [id number]   YOB: 1955   Date of Visit: 3/9/2018       Dear Dr. Mills:    Thank you for referring Glory Chawla to me for evaluation. Attached you will find relevant portions of my assessment and plan of care.    If you have questions, please do not hesitate to call me. I look forward to following Glory Chawla along with you.    Sincerely,      Lida Whitley MD            CC  No Recipients    Enclosure

## 2018-03-12 ENCOUNTER — TELEPHONE (OUTPATIENT)
Dept: HEPATOLOGY | Facility: CLINIC | Age: 63
End: 2018-03-12

## 2018-03-12 NOTE — TELEPHONE ENCOUNTER
----- Message from Nieves Davis sent at 3/12/2018 10:55 AM CDT -----  IR consult scheduled 3/21 @ 0800.  Lelia please notify patient and mail the appointment confirmation.    Thanks  ----- Message -----  From: Lida Whitley MD  Sent: 3/9/2018  10:33 AM  To: Nieves Davis, Lelia Hylton LPN    Hi Nieves,    I saw Ms. Chawla today, she opted to go ahead with IR-ablation for her small HCC. I placed IR consult order. Please schedule IR consult ASAP. I will follow her in Hepatology. Thank you.    Lida

## 2018-03-20 ENCOUNTER — TELEPHONE (OUTPATIENT)
Dept: HEPATOLOGY | Facility: CLINIC | Age: 63
End: 2018-03-20

## 2018-03-20 NOTE — TELEPHONE ENCOUNTER
----- Message from Daysi Biswas RN sent at 3/15/2018 12:23 PM CDT -----  Contact: Jessica bello/[facility] Radiology      ----- Message -----  From: Donna Jacob  Sent: 3/14/2018  10:21 AM  To: [facility] Hepatitis C Staff    Calling bc MRI/CT/US  Disc was sent fedex on 3/6/18 and have another request for it on 3/9/18. Would like to know if this disc was rec'd    pls call to confirm or deny rec't of disc    Jessica [phone]

## 2018-03-20 NOTE — TELEPHONE ENCOUNTER
3/9/18 Message in chart regarding the disc.  Received disc from [facility] in the mail and brought to 2nd floor IR for upload to patient records (not IR, per Dr. Whitley).    Call placed to Jessica at [phone]. No not present. Spoke to Grover. Relayed the message that we received the CD disc on Glory Chawla at [facility]. Will give Jessica the message.

## 2018-03-21 ENCOUNTER — INITIAL CONSULT (OUTPATIENT)
Dept: INTERVENTIONAL RADIOLOGY/VASCULAR | Facility: CLINIC | Age: 63
End: 2018-03-21
Payer: MEDICAID

## 2018-03-21 VITALS
WEIGHT: 168.31 LBS | HEIGHT: 63 IN | HEART RATE: 88 BPM | BODY MASS INDEX: 29.82 KG/M2 | DIASTOLIC BLOOD PRESSURE: 90 MMHG | SYSTOLIC BLOOD PRESSURE: 133 MMHG

## 2018-03-21 DIAGNOSIS — C22.0 HCC (HEPATOCELLULAR CARCINOMA): Primary | ICD-10-CM

## 2018-03-21 PROCEDURE — 99213 OFFICE O/P EST LOW 20 MIN: CPT | Mod: PBBFAC

## 2018-03-21 PROCEDURE — 99999 PR PBB SHADOW E&M-EST. PATIENT-LVL III: CPT | Mod: PBBFAC,,,

## 2018-03-21 PROCEDURE — 99203 OFFICE O/P NEW LOW 30 MIN: CPT | Mod: S$PBB,,, | Performed by: FAMILY MEDICINE

## 2018-03-21 NOTE — PROGRESS NOTES
Subjective:       Patient ID: Glory Chawla is a 62 y.o. female.    Chief Complaint: Cancer    Patient here to discuss treatment of her biopsy proven hepatocellular carcinoma. She was in care with [facility]. Her biopsy was performed on a 1 cm lesion of her liver on 12/14/2017. She reports feeling well. She denies any abdominal pain or distention. She was reviewed in liver conference.      Review of Systems   Constitutional: Negative for activity change, appetite change, chills, fatigue and fever.   HENT: Negative for congestion, drooling, ear discharge, postnasal drip, sneezing and trouble swallowing.    Eyes: Negative for pain, discharge, redness and itching.   Respiratory: Negative for cough, shortness of breath, wheezing and stridor.    Cardiovascular: Positive for leg swelling (left leg when standing for long periods of time). Negative for chest pain and palpitations.   Gastrointestinal: Negative for abdominal distention, abdominal pain, constipation, diarrhea, nausea and vomiting.   Genitourinary: Negative for difficulty urinating, dysuria, frequency and urgency.   Musculoskeletal: Positive for arthralgias (hx of arthritis). Negative for back pain, gait problem, joint swelling and myalgias.   Skin: Negative for color change, pallor and rash.   Neurological: Negative for dizziness, weakness and headaches.       Objective:      Physical Exam   Constitutional: She is oriented to person, place, and time. She appears well-developed and well-nourished. No distress.   HENT:   Head: Normocephalic and atraumatic.   Right Ear: External ear normal.   Left Ear: External ear normal.   Nose: Nose normal.   Mouth/Throat: Oropharynx is clear and moist. No oropharyngeal exudate.   Eyes: Conjunctivae are normal. Pupils are equal, round, and reactive to light. Right eye exhibits no discharge. Left eye exhibits no discharge. No scleral icterus.   Neck: Neck supple. No tracheal deviation present. No thyromegaly present.    Cardiovascular: Normal rate, regular rhythm, normal heart sounds and intact distal pulses.  Exam reveals no gallop and no friction rub.    No murmur heard.  Pulmonary/Chest: Effort normal and breath sounds normal. No stridor. No respiratory distress. She has no wheezes. She has no rales.   Abdominal: Soft. Bowel sounds are normal. She exhibits no distension and no mass. There is no hepatosplenomegaly. There is no tenderness. There is no guarding.   Musculoskeletal: She exhibits edema (trace in left lower extremity).   Lymphadenopathy:     She has no cervical adenopathy.   Neurological: She is alert and oriented to person, place, and time. Gait normal.   Skin: Skin is warm and dry. She is not diaphoretic. No cyanosis. Nails show no clubbing.   Psychiatric: She has a normal mood and affect.   Vitals reviewed.      Assessment:       1. HCC (hepatocellular carcinoma)        Plan:         Explained to patient recommendation is to treat with chemoembolization. TACE procedure discussed in detail with the patient including risks, benefits, potential complications, usual pre and post procedure course, as well as the potential to develop post-embolization syndrome. Utilized images from her MRI, the internet, and illustrations to help explain the procedure. Patient tells me she was told it would be burned. Explained that the location of the lesion is too close to her intestines (I reviewed her MRI with Dr. Overton prior to our clinic visit). The risk of non-target ablation outweighs the benefit. Showed patient images of the lesion and pointed out proximity to intestines. Explained chemoembolization would be safer. She asks if this would cure her. I explain the cure is transplant. The locoregional procedures we offer do not cure her cancer. They are designed to try to control and minimize the burden of her disease. She asks about side effects. I again review the potential for post-embolization syndrome. She asks why can't  "they burn it. I explain again that it is too close to her intestines. I further explain accidental ablation of her intestines would result in surgery and hospitalization, and potentially death. She tells me she is confused because she was told it could be burned once, and then she would be done with this. I explain even if we were able to safely burn her lesion, this is still not a cure. She would require future surveillance. I explain there aren't any treatment options that would allow her to be treated once, and not have future appointments. Locoregional therapy requires surveillance, and potential future treatments. Transplant requires evaluations and follow up. She asks why should she have anything done because her tumor is "so small". I explain we are concerned because it is cancer. Cancer grows and spreads when left alone, however, she does have the option of not treating. She can watch and wait for it to enlarge before treatment. She tells me "I don't know what to do." I reassure her if she would like to go home and discuss with her family first, she can take the time to decide. She then tells me she may have to go to another hospital because she feels different people are telling her different things. I reassure her she is welcome to get another opinion. She asks me again why can we not cut/burn the tumor out. I explain our department does not cut out tumors. She would have to discuss with Dr. Whitley or surgery. We cannot burn this tumor because of its proximity to her intestines. It is not safe for us to burn this tumor due to its location. She asks me if I feel this is the best treatment for her. I explain that this is the safest treatment our department has to offer. She is welcome to discuss other options with Dr. Whitley. I also explain that this treatment won't limit her future treatment options. She tells me she has to go. She cannot make a decision and is going to go home. As we are walking out of the " "clinic room she again expresses her frustration at being told different things. She asks about side effects of this treatment. I again explain post-embolization syndrome: fatigue, low grade fever, body aches, nausea, abdominal pain. This may last a few days to a few weeks. She tells me "set it up." We walk back to the clinic room and she decides to schedule a chemoembolization for 4/6/2018. Pre-procedure handout with clinic phone number provided.   "

## 2018-03-23 ENCOUNTER — TELEPHONE (OUTPATIENT)
Dept: HEPATOLOGY | Facility: CLINIC | Age: 63
End: 2018-03-23

## 2018-03-23 NOTE — TELEPHONE ENCOUNTER
Writer left message for patient to return call to talk to her about her IR appointment and make sure she is comfortable or if she needs to speak to Dr. Whitley

## 2018-03-23 NOTE — TELEPHONE ENCOUNTER
----- Message from Lida Whitley MD sent at 3/21/2018  5:11 PM CDT -----  I read IR notes.    I think it is her personality. On her first visit, she was told by Charley to bring her [facility] documents. She did not bring CD and documents. I had to find it on [facility] which is incomplete. She started yelling at me and was frustrated and left.     After IR conference, I asked her about waiting and repeating scan in the future to see if she meets T2 HCC for transplant. She said no she does not want to wait and get treated. Of course, I did not know the part of HCC close to the intestines and ablation is not possible.    I think she will deny OLT. In fact, she has PETH positive, recent alcohol use, non-compliance, not really sure if she is a good candidate for OLT.    Please call her if she wants to discuss with me again before TACE. I am happy to go over things again with her.     Thank you.    Lida    ----- Message -----  From: Susan Ortiz LPN  Sent: 3/21/2018  11:17 AM  To: Nieves Davis, Lida Whitley MD, #    Dr Angi,      The patient was seen today in IR clinic to discuss treatment options and recommendations from IR conference.  The patient was having a hard time understanding the information provided about her diagnosis and treatment plan.  The patient may also need to have someone with her when her when you speak with her.  She is scheduled for TACE on 4/2/18 do you think she can be seen or called  prior to her scheduled appointment ?    Thanks  Mer

## 2018-04-05 DIAGNOSIS — C22.0 HCC (HEPATOCELLULAR CARCINOMA): Primary | ICD-10-CM

## 2018-04-06 ENCOUNTER — SURGERY (OUTPATIENT)
Age: 63
End: 2018-04-06

## 2018-04-06 ENCOUNTER — HOSPITAL ENCOUNTER (OUTPATIENT)
Facility: HOSPITAL | Age: 63
Discharge: HOME | End: 2018-04-06
Attending: RADIOLOGY | Admitting: RADIOLOGY
Payer: MEDICAID

## 2018-04-06 VITALS
RESPIRATION RATE: 19 BRPM | HEIGHT: 63 IN | OXYGEN SATURATION: 100 % | DIASTOLIC BLOOD PRESSURE: 97 MMHG | SYSTOLIC BLOOD PRESSURE: 143 MMHG | WEIGHT: 168 LBS | HEART RATE: 104 BPM | TEMPERATURE: 97 F | BODY MASS INDEX: 29.77 KG/M2

## 2018-04-06 DIAGNOSIS — C22.0 HCC (HEPATOCELLULAR CARCINOMA): ICD-10-CM

## 2018-04-06 PROCEDURE — 63600175 PHARM REV CODE 636 W HCPCS: Performed by: FAMILY MEDICINE

## 2018-04-06 PROCEDURE — 25500020 PHARM REV CODE 255: Performed by: RADIOLOGY

## 2018-04-06 PROCEDURE — 25000003 PHARM REV CODE 250

## 2018-04-06 PROCEDURE — 25000003 PHARM REV CODE 250: Performed by: FAMILY MEDICINE

## 2018-04-06 PROCEDURE — 63600175 PHARM REV CODE 636 W HCPCS: Performed by: RADIOLOGY

## 2018-04-06 RX ORDER — MIDAZOLAM HYDROCHLORIDE 1 MG/ML
1 INJECTION INTRAMUSCULAR; INTRAVENOUS
Status: DISCONTINUED | OUTPATIENT
Start: 2018-04-06 | End: 2018-04-06 | Stop reason: HOSPADM

## 2018-04-06 RX ORDER — HEPARIN SODIUM 200 [USP'U]/100ML
500 INJECTION, SOLUTION INTRAVENOUS CONTINUOUS
Status: DISCONTINUED | OUTPATIENT
Start: 2018-04-06 | End: 2018-04-06 | Stop reason: HOSPADM

## 2018-04-06 RX ORDER — MIDAZOLAM HYDROCHLORIDE 1 MG/ML
INJECTION INTRAMUSCULAR; INTRAVENOUS CODE/TRAUMA/SEDATION MEDICATION
Status: COMPLETED | OUTPATIENT
Start: 2018-04-06 | End: 2018-04-06

## 2018-04-06 RX ORDER — HYDROCODONE BITARTRATE AND ACETAMINOPHEN 10; 325 MG/1; MG/1
TABLET ORAL
Status: COMPLETED
Start: 2018-04-06 | End: 2018-04-06

## 2018-04-06 RX ORDER — OXYCODONE HYDROCHLORIDE 5 MG/1
5 TABLET ORAL EVERY 6 HOURS PRN
Qty: 10 TABLET | Refills: 0 | Status: SHIPPED | OUTPATIENT
Start: 2018-04-06 | End: 2019-01-04 | Stop reason: SDUPTHER

## 2018-04-06 RX ORDER — ONDANSETRON 4 MG/1
8 TABLET, ORALLY DISINTEGRATING ORAL EVERY 8 HOURS PRN
Qty: 12 TABLET | Refills: 0 | Status: SHIPPED | OUTPATIENT
Start: 2018-04-06 | End: 2019-01-04

## 2018-04-06 RX ORDER — FENTANYL CITRATE 50 UG/ML
50 INJECTION, SOLUTION INTRAMUSCULAR; INTRAVENOUS
Status: DISCONTINUED | OUTPATIENT
Start: 2018-04-06 | End: 2018-04-06 | Stop reason: HOSPADM

## 2018-04-06 RX ORDER — AMOXICILLIN AND CLAVULANATE POTASSIUM 500; 125 MG/1; MG/1
1 TABLET, FILM COATED ORAL 2 TIMES DAILY
Qty: 10 TABLET | Refills: 0 | Status: SHIPPED | OUTPATIENT
Start: 2018-04-06 | End: 2019-01-04 | Stop reason: ALTCHOICE

## 2018-04-06 RX ORDER — LIDOCAINE HYDROCHLORIDE 10 MG/ML
1 INJECTION, SOLUTION EPIDURAL; INFILTRATION; INTRACAUDAL; PERINEURAL ONCE
Status: COMPLETED | OUTPATIENT
Start: 2018-04-06 | End: 2018-04-06

## 2018-04-06 RX ORDER — ONDANSETRON 2 MG/ML
INJECTION INTRAMUSCULAR; INTRAVENOUS CODE/TRAUMA/SEDATION MEDICATION
Status: COMPLETED | OUTPATIENT
Start: 2018-04-06 | End: 2018-04-06

## 2018-04-06 RX ORDER — DIPHENHYDRAMINE HYDROCHLORIDE 50 MG/ML
50 INJECTION INTRAMUSCULAR; INTRAVENOUS ONCE
Status: COMPLETED | OUTPATIENT
Start: 2018-04-06 | End: 2018-04-06

## 2018-04-06 RX ORDER — HYDROCODONE BITARTRATE AND ACETAMINOPHEN 10; 325 MG/1; MG/1
1 TABLET ORAL ONCE
Status: DISCONTINUED | OUTPATIENT
Start: 2018-04-06 | End: 2018-04-06 | Stop reason: HOSPADM

## 2018-04-06 RX ORDER — SODIUM CHLORIDE 9 MG/ML
INJECTION, SOLUTION INTRAVENOUS CONTINUOUS
Status: DISCONTINUED | OUTPATIENT
Start: 2018-04-06 | End: 2018-04-06 | Stop reason: HOSPADM

## 2018-04-06 RX ORDER — ONDANSETRON 4 MG/1
4 TABLET, FILM COATED ORAL EVERY 6 HOURS PRN
Status: DISCONTINUED | OUTPATIENT
Start: 2018-04-06 | End: 2018-04-06 | Stop reason: HOSPADM

## 2018-04-06 RX ORDER — FENTANYL CITRATE 50 UG/ML
INJECTION, SOLUTION INTRAMUSCULAR; INTRAVENOUS CODE/TRAUMA/SEDATION MEDICATION
Status: COMPLETED | OUTPATIENT
Start: 2018-04-06 | End: 2018-04-06

## 2018-04-06 RX ADMIN — HYDROCORTISONE SODIUM SUCCINATE 200 MG: 100 INJECTION, POWDER, FOR SOLUTION INTRAMUSCULAR; INTRAVENOUS at 10:04

## 2018-04-06 RX ADMIN — DOXORUBICIN HYDROCHLORIDE 50 MG: 2 INJECTION, SOLUTION INTRAVENOUS at 02:04

## 2018-04-06 RX ADMIN — DIPHENHYDRAMINE HYDROCHLORIDE 50 MG: 50 INJECTION, SOLUTION INTRAMUSCULAR; INTRAVENOUS at 10:04

## 2018-04-06 RX ADMIN — MIDAZOLAM HYDROCHLORIDE 2 MG: 1 INJECTION, SOLUTION INTRAMUSCULAR; INTRAVENOUS at 01:04

## 2018-04-06 RX ADMIN — MIDAZOLAM HYDROCHLORIDE 1 MG: 1 INJECTION, SOLUTION INTRAMUSCULAR; INTRAVENOUS at 01:04

## 2018-04-06 RX ADMIN — LIDOCAINE HYDROCHLORIDE 0.1 MG: 10 INJECTION, SOLUTION EPIDURAL; INFILTRATION; INTRACAUDAL; PERINEURAL at 10:04

## 2018-04-06 RX ADMIN — FENTANYL CITRATE 25 MCG: 50 INJECTION, SOLUTION INTRAMUSCULAR; INTRAVENOUS at 02:04

## 2018-04-06 RX ADMIN — FENTANYL CITRATE 50 MCG: 50 INJECTION, SOLUTION INTRAMUSCULAR; INTRAVENOUS at 01:04

## 2018-04-06 RX ADMIN — FENTANYL CITRATE 25 MCG: 50 INJECTION, SOLUTION INTRAMUSCULAR; INTRAVENOUS at 01:04

## 2018-04-06 RX ADMIN — SODIUM CHLORIDE 3 G: 9 INJECTION, SOLUTION INTRAVENOUS at 11:04

## 2018-04-06 RX ADMIN — SODIUM CHLORIDE: 9 INJECTION, SOLUTION INTRAVENOUS at 10:04

## 2018-04-06 RX ADMIN — IOHEXOL 85 ML: 300 INJECTION, SOLUTION INTRAVENOUS at 02:04

## 2018-04-06 RX ADMIN — ONDANSETRON 4 MG: 2 INJECTION INTRAMUSCULAR; INTRAVENOUS at 02:04

## 2018-04-06 RX ADMIN — MIDAZOLAM HYDROCHLORIDE 0.5 MG: 1 INJECTION, SOLUTION INTRAMUSCULAR; INTRAVENOUS at 01:04

## 2018-04-06 RX ADMIN — HYDROCODONE BITARTRATE AND ACETAMINOPHEN 1 TABLET: 10; 325 TABLET ORAL at 04:04

## 2018-04-06 RX ADMIN — MIDAZOLAM HYDROCHLORIDE 0.5 MG: 1 INJECTION, SOLUTION INTRAMUSCULAR; INTRAVENOUS at 02:04
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
## 2018-04-06 NOTE — PROGRESS NOTES
Patient to . Orders, labs, and allergies reviewed.  Pt oriented to unit and staff. Comfort measures utilized. Pt safely transferred from stretcher to procedural table. Safety strap applied. Blankets applied. Pt prepped and draped utilizing standard sterile technique. Pt resting comfortably. Denies pain/discomfort. Will continue to monitor. See flow sheets for monitoring.

## 2018-04-06 NOTE — DISCHARGE INSTRUCTIONS
For questions or concerns call: [facility] 8 AM- 5PM [phone]. Radiology resident on call [phone].    For immediate concerns that are not emergent, you may call our radiology clinic at: [phone]

## 2018-04-06 NOTE — SEDATION DOCUMENTATION
Tace complete. Patient tolerated without any signs of acute distress noted. Exoseal deployed at 1442. HOB to remain flat until 1645. To ROCU to recover.

## 2018-04-06 NOTE — PROCEDURES
Radiology Post-Procedure Note    Pre Op Diagnosis: Liver lesions    Post Op Diagnosis: Liver lesions    Procedure: Chemoembolization    Procedure Performed by: Huey Overton MD and Derick Pollock MD    Written Informed Consent Obtained: Yes    Specimen Removed: None    Estimated Blood Loss: less than 50     Findings:     After placement of a right femoral artery sheath, a 5-Nigerien catheter was inserted and angiography of the celiac artery  for anatomic evaluation and localization of hepatic tumor.  A microcatheter was introduced into feeding arteries of a right hepatic lobe tumor and LC beads coated with doxorubicin were injected until near stagnant flow was achieved.  Post procedural angiography revealed no complications.    Right femoral artery angiogram was performed and the sheath was removed.  Hemostasis was achieved using EXOSEAL technique.  There was no hematoma at the time of hemostasis.    The patient tolerated procedure well.  Please see Imaging report for further details.    Derick Pollock MD  PGY-5  Department of Radiology  [phone]

## 2018-04-06 NOTE — PLAN OF CARE
Problem: Patient Care Overview  Goal: Plan of Care Review  Outcome: Outcome(s) achieved Date Met: 04/06/18  Awake and alert. VSS. Denies  Nausea. Pain is tolerable. Tolerating liquids well. Voiding well. DC instructions given to patient and family and they verbalize understanding. R groin dressing dry and intact. No bleeding or hematoma noted. Tolerated well 2 hours flat bedrest.

## 2018-04-06 NOTE — PLAN OF CARE
Pt resting comfortably.    Call light in reach.    No questions or concerns at this time.    Pt requesting bedside commode

## 2018-04-06 NOTE — DISCHARGE SUMMARY
Radiology Discharge Summary      Hospital Course: No complications    Admit Date: 4/6/2018  Discharge Date: 04/06/2018     Instructions Given to Patient: Yes  Diet: Resume prior diet  Activity: activity as tolerated and no driving for today    Description of Condition on Discharge: Stable  Vital Signs (Most Recent): Temp: 97 °F (36.1 °C) (04/06/18 1533)  Pulse: 90 (04/06/18 1544)  Resp: 16 (04/06/18 1544)  BP: 129/81 (04/06/18 1544)  SpO2: 98 % (04/06/18 1544)    Discharge Disposition: Home    Discharge Diagnosis: liver lesions     Follow-up: Patient to return for imaging in about 1 month.    Derick Pollock MD  PGY-5  Department of Radiology  [phone]

## 2018-04-13 DIAGNOSIS — C22.0 HCC (HEPATOCELLULAR CARCINOMA): Primary | ICD-10-CM

## 2018-05-07 ENCOUNTER — OFFICE VISIT (OUTPATIENT)
Dept: INTERVENTIONAL RADIOLOGY/VASCULAR | Facility: CLINIC | Age: 63
End: 2018-05-07
Payer: MEDICAID

## 2018-05-07 ENCOUNTER — HOSPITAL ENCOUNTER (OUTPATIENT)
Dept: RADIOLOGY | Facility: HOSPITAL | Age: 63
Discharge: HOME | End: 2018-05-07
Attending: FAMILY MEDICINE
Payer: MEDICAID

## 2018-05-07 VITALS
BODY MASS INDEX: 29.35 KG/M2 | HEIGHT: 63 IN | SYSTOLIC BLOOD PRESSURE: 132 MMHG | HEART RATE: 111 BPM | DIASTOLIC BLOOD PRESSURE: 80 MMHG | WEIGHT: 165.63 LBS

## 2018-05-07 DIAGNOSIS — C22.0 HCC (HEPATOCELLULAR CARCINOMA): Primary | ICD-10-CM

## 2018-05-07 DIAGNOSIS — C22.0 HCC (HEPATOCELLULAR CARCINOMA): ICD-10-CM

## 2018-05-07 PROCEDURE — 74183 MRI ABD W/O CNTR FLWD CNTR: CPT | Mod: TC

## 2018-05-07 PROCEDURE — A9585 GADOBUTROL INJECTION: HCPCS | Performed by: FAMILY MEDICINE

## 2018-05-07 PROCEDURE — 99212 OFFICE O/P EST SF 10 MIN: CPT | Mod: PBBFAC,25

## 2018-05-07 PROCEDURE — 25500020 PHARM REV CODE 255: Performed by: FAMILY MEDICINE

## 2018-05-07 PROCEDURE — 74183 MRI ABD W/O CNTR FLWD CNTR: CPT | Mod: 26,,, | Performed by: RADIOLOGY

## 2018-05-07 PROCEDURE — 99999 PR PBB SHADOW E&M-EST. PATIENT-LVL II: CPT | Mod: PBBFAC,,,

## 2018-05-07 PROCEDURE — 99213 OFFICE O/P EST LOW 20 MIN: CPT | Mod: S$PBB,,, | Performed by: FAMILY MEDICINE

## 2018-05-07 RX ORDER — GADOBUTROL 604.72 MG/ML
10 INJECTION INTRAVENOUS
Status: COMPLETED | OUTPATIENT
Start: 2018-05-07 | End: 2018-05-07

## 2018-05-07 RX ADMIN — GADOBUTROL 10 ML: 604.72 INJECTION INTRAVENOUS at 08:05

## 2018-05-07 NOTE — PROGRESS NOTES
Subjective:       Patient ID: Glory Chawla is a 62 y.o. female.    Chief Complaint: Hepatocellular Carcinoma    Patient here for follow up of her hepatocellular carcinoma recently treated with chemoembolization on 4/6/2018. She reports feeling well. She denies any abdominal pain. She had an MRI this morning. She has complaints of fatigue from having to wait for her results. She tells me this appointment should have been done over the phone. She tells me she does not want to keep coming back and forth to doctor's appointments. She is requesting a copy of her MRI results to show her family.      Review of Systems   Constitutional: Positive for fatigue. Negative for activity change, appetite change, chills and fever.   Respiratory: Negative for cough, shortness of breath, wheezing and stridor.    Cardiovascular: Negative for chest pain, palpitations and leg swelling.   Gastrointestinal: Negative for abdominal distention, abdominal pain, constipation, diarrhea, nausea and vomiting.       Objective:      Physical Exam   Constitutional: She is oriented to person, place, and time. She appears well-developed and well-nourished. No distress.   Cardiovascular: Normal rate, regular rhythm and normal heart sounds.  Exam reveals no gallop and no friction rub.    No murmur heard.  Pulmonary/Chest: Effort normal and breath sounds normal. No respiratory distress. She has no wheezes. She has no rales.   Abdominal: Soft. Bowel sounds are normal. She exhibits distension. She exhibits no mass. There is no tenderness. There is no guarding.   Neurological: She is alert and oriented to person, place, and time. Gait normal.   Skin: Skin is warm and dry. She is not diaphoretic.   Psychiatric: She has a normal mood and affect.   Vitals reviewed.      Assessment:       1. HCC (hepatocellular carcinoma)        Plan:         Dr. Overton reviewed images. Explained there is improvement. There is still some residual. Dr. Overton's  recommendation is to either wait and repeat imaging in 3 months or we can try ablating the lesion. Discussed how the albation will be performed, risk/benefits (such as damage to nearby bowel), possible complications, and pre-post procedure expectations, including the need for general anesthesia and abdominal pain post procedure. I also explain that it takes time to have her MRI read and then discuss with the physician. I also explain that unfortunately with cancer treatment comes many appointments. I offer the option of coming on two separate days. She tells me she cannot. I also explain that our visit allows me to perform an exam. She insists she cannot come and wait all day. She tells me she is too tired. She tells me she has other doctor's appointments that she needs to go to. She tells me she would like to be treated. She tells me she does not want to wait 3 months, but she cannot have any procedures done until June. She tells me she is moving and her birthday is on [birth year]. She does not want to have anything done until after that. I explain we do not have the physician's schedule for June, but we can call her when we do. I confirm her phone number. Copy of her MRI results and clinic phone number provided.

## 2018-05-22 DIAGNOSIS — C22.0 HCC (HEPATOCELLULAR CARCINOMA): Primary | ICD-10-CM

## 2018-06-08 ENCOUNTER — RESEARCH ENCOUNTER (OUTPATIENT)
Dept: RESEARCH | Facility: HOSPITAL | Age: 63
End: 2018-06-08

## 2018-06-08 NOTE — PROGRESS NOTES
Pt's IR Embollization rescheduled for 6/29/18.  Will attempt to consent for research study at that time.    Karely Christian  Admin Research

## 2018-06-28 ENCOUNTER — ANESTHESIA EVENT (OUTPATIENT)
Dept: ENDOSCOPY | Facility: HOSPITAL | Age: 63
End: 2018-06-28
Payer: MEDICAID

## 2018-06-28 DIAGNOSIS — C22.0 HCC (HEPATOCELLULAR CARCINOMA): Primary | ICD-10-CM

## 2018-06-29 ENCOUNTER — ANESTHESIA (OUTPATIENT)
Dept: ENDOSCOPY | Facility: HOSPITAL | Age: 63
End: 2018-06-29
Payer: MEDICAID

## 2018-06-29 ENCOUNTER — RESEARCH ENCOUNTER (OUTPATIENT)
Dept: RESEARCH | Facility: HOSPITAL | Age: 63
End: 2018-06-29

## 2018-06-29 ENCOUNTER — HOSPITAL ENCOUNTER (OUTPATIENT)
Facility: HOSPITAL | Age: 63
Discharge: HOME | End: 2018-06-30
Attending: RADIOLOGY | Admitting: RADIOLOGY
Payer: MEDICAID

## 2018-06-29 DIAGNOSIS — C22.0 HCC (HEPATOCELLULAR CARCINOMA): ICD-10-CM

## 2018-06-29 LAB
ESTIMATED AVG GLUCOSE: 85 MG/DL
HBA1C MFR BLD HPLC: 4.6 %

## 2018-06-29 PROCEDURE — 63600175 PHARM REV CODE 636 W HCPCS: Performed by: NURSE ANESTHETIST, CERTIFIED REGISTERED

## 2018-06-29 PROCEDURE — G0378 HOSPITAL OBSERVATION PER HR: HCPCS

## 2018-06-29 PROCEDURE — 25000003 PHARM REV CODE 250: Performed by: STUDENT IN AN ORGANIZED HEALTH CARE EDUCATION/TRAINING PROGRAM

## 2018-06-29 PROCEDURE — D9220A PRA ANESTHESIA: Mod: CRNA,,, | Performed by: NURSE ANESTHETIST, CERTIFIED REGISTERED

## 2018-06-29 PROCEDURE — 37000009 HC ANESTHESIA EA ADD 15 MINS

## 2018-06-29 PROCEDURE — 71000045 HC DOSC ROUTINE RECOVERY EA ADD'L HR

## 2018-06-29 PROCEDURE — S0030 INJECTION, METRONIDAZOLE: HCPCS | Performed by: FAMILY MEDICINE

## 2018-06-29 PROCEDURE — 71000044 HC DOSC ROUTINE RECOVERY FIRST HOUR

## 2018-06-29 PROCEDURE — 25000003 PHARM REV CODE 250: Performed by: RADIOLOGY

## 2018-06-29 PROCEDURE — 25000003 PHARM REV CODE 250: Performed by: NURSE ANESTHETIST, CERTIFIED REGISTERED

## 2018-06-29 PROCEDURE — S0028 INJECTION, FAMOTIDINE, 20 MG: HCPCS | Performed by: RADIOLOGY

## 2018-06-29 PROCEDURE — 83036 HEMOGLOBIN GLYCOSYLATED A1C: CPT

## 2018-06-29 PROCEDURE — 27000221 HC OXYGEN, UP TO 24 HOURS

## 2018-06-29 PROCEDURE — 63600175 PHARM REV CODE 636 W HCPCS: Performed by: FAMILY MEDICINE

## 2018-06-29 PROCEDURE — 94761 N-INVAS EAR/PLS OXIMETRY MLT: CPT | Mod: 59

## 2018-06-29 PROCEDURE — 25000003 PHARM REV CODE 250: Performed by: FAMILY MEDICINE

## 2018-06-29 PROCEDURE — 00790 ANES IPER UPR ABD NOS: CPT

## 2018-06-29 PROCEDURE — 36415 COLL VENOUS BLD VENIPUNCTURE: CPT

## 2018-06-29 PROCEDURE — D9220A PRA ANESTHESIA: Mod: ANES,,, | Performed by: ANESTHESIOLOGY

## 2018-06-29 PROCEDURE — 37000008 HC ANESTHESIA 1ST 15 MINUTES

## 2018-06-29 PROCEDURE — 63600175 PHARM REV CODE 636 W HCPCS: Performed by: ANESTHESIOLOGY

## 2018-06-29 RX ORDER — MIDAZOLAM HYDROCHLORIDE 1 MG/ML
INJECTION, SOLUTION INTRAMUSCULAR; INTRAVENOUS
Status: DISCONTINUED | OUTPATIENT
Start: 2018-06-29 | End: 2018-06-29

## 2018-06-29 RX ORDER — LIDOCAINE HCL/PF 100 MG/5ML
SYRINGE (ML) INTRAVENOUS
Status: DISCONTINUED | OUTPATIENT
Start: 2018-06-29 | End: 2018-06-29

## 2018-06-29 RX ORDER — ROCURONIUM BROMIDE 10 MG/ML
INJECTION, SOLUTION INTRAVENOUS
Status: DISCONTINUED | OUTPATIENT
Start: 2018-06-29 | End: 2018-06-29

## 2018-06-29 RX ORDER — AMOXICILLIN AND CLAVULANATE POTASSIUM 500; 125 MG/1; MG/1
1 TABLET, FILM COATED ORAL 2 TIMES DAILY
Qty: 14 TABLET | Refills: 0 | Status: SHIPPED | OUTPATIENT
Start: 2018-06-29 | End: 2018-07-06

## 2018-06-29 RX ORDER — FENTANYL CITRATE 50 UG/ML
INJECTION, SOLUTION INTRAMUSCULAR; INTRAVENOUS
Status: DISCONTINUED | OUTPATIENT
Start: 2018-06-29 | End: 2018-06-29

## 2018-06-29 RX ORDER — DEXAMETHASONE SODIUM PHOSPHATE 4 MG/ML
INJECTION, SOLUTION INTRA-ARTICULAR; INTRALESIONAL; INTRAMUSCULAR; INTRAVENOUS; SOFT TISSUE
Status: DISCONTINUED | OUTPATIENT
Start: 2018-06-29 | End: 2018-06-29

## 2018-06-29 RX ORDER — METRONIDAZOLE 500 MG/100ML
500 INJECTION, SOLUTION INTRAVENOUS ONCE
Status: COMPLETED | OUTPATIENT
Start: 2018-06-29 | End: 2018-06-29

## 2018-06-29 RX ORDER — FENTANYL CITRATE 50 UG/ML
25 INJECTION, SOLUTION INTRAMUSCULAR; INTRAVENOUS EVERY 5 MIN PRN
Status: COMPLETED | OUTPATIENT
Start: 2018-06-29 | End: 2018-06-29

## 2018-06-29 RX ORDER — GLYCOPYRROLATE 0.2 MG/ML
INJECTION INTRAMUSCULAR; INTRAVENOUS
Status: DISCONTINUED | OUTPATIENT
Start: 2018-06-29 | End: 2018-06-29

## 2018-06-29 RX ORDER — PROMETHAZINE HYDROCHLORIDE 12.5 MG/1
25 TABLET ORAL EVERY 6 HOURS PRN
Status: DISCONTINUED | OUTPATIENT
Start: 2018-06-29 | End: 2018-06-30 | Stop reason: HOSPADM

## 2018-06-29 RX ORDER — SODIUM CHLORIDE 0.9 % (FLUSH) 0.9 %
3 SYRINGE (ML) INJECTION
Status: DISCONTINUED | OUTPATIENT
Start: 2018-06-29 | End: 2018-06-30 | Stop reason: HOSPADM

## 2018-06-29 RX ORDER — FAMOTIDINE 10 MG/ML
20 INJECTION INTRAVENOUS 2 TIMES DAILY
Status: DISCONTINUED | OUTPATIENT
Start: 2018-06-29 | End: 2018-06-30 | Stop reason: HOSPADM

## 2018-06-29 RX ORDER — ONDANSETRON 4 MG/1
4 TABLET, ORALLY DISINTEGRATING ORAL EVERY 6 HOURS PRN
Qty: 30 TABLET | Refills: 0 | Status: SHIPPED | OUTPATIENT
Start: 2018-06-29 | End: 2019-01-04

## 2018-06-29 RX ORDER — SODIUM CHLORIDE 9 MG/ML
500 INJECTION, SOLUTION INTRAVENOUS ONCE
Status: COMPLETED | OUTPATIENT
Start: 2018-06-29 | End: 2018-06-29

## 2018-06-29 RX ORDER — OXYCODONE HYDROCHLORIDE 5 MG/1
5 TABLET ORAL EVERY 6 HOURS PRN
Status: DISCONTINUED | OUTPATIENT
Start: 2018-06-29 | End: 2018-06-30 | Stop reason: HOSPADM

## 2018-06-29 RX ORDER — PROPOFOL 10 MG/ML
VIAL (ML) INTRAVENOUS
Status: DISCONTINUED | OUTPATIENT
Start: 2018-06-29 | End: 2018-06-29

## 2018-06-29 RX ORDER — ONDANSETRON 8 MG/1
8 TABLET, ORALLY DISINTEGRATING ORAL EVERY 8 HOURS PRN
Status: DISCONTINUED | OUTPATIENT
Start: 2018-06-29 | End: 2018-06-30 | Stop reason: HOSPADM

## 2018-06-29 RX ORDER — ONDANSETRON 2 MG/ML
INJECTION INTRAMUSCULAR; INTRAVENOUS
Status: DISCONTINUED | OUTPATIENT
Start: 2018-06-29 | End: 2018-06-29

## 2018-06-29 RX ORDER — GLUCAGON 1 MG
1 KIT INJECTION
Status: DISCONTINUED | OUTPATIENT
Start: 2018-06-29 | End: 2018-06-30 | Stop reason: HOSPADM

## 2018-06-29 RX ORDER — CIPROFLOXACIN 2 MG/ML
400 INJECTION, SOLUTION INTRAVENOUS ONCE
Status: COMPLETED | OUTPATIENT
Start: 2018-06-29 | End: 2018-06-29

## 2018-06-29 RX ORDER — IBUPROFEN 200 MG
24 TABLET ORAL
Status: DISCONTINUED | OUTPATIENT
Start: 2018-06-29 | End: 2018-06-30 | Stop reason: HOSPADM

## 2018-06-29 RX ORDER — PHENYLEPHRINE HYDROCHLORIDE 10 MG/ML
INJECTION INTRAVENOUS
Status: DISCONTINUED | OUTPATIENT
Start: 2018-06-29 | End: 2018-06-29

## 2018-06-29 RX ORDER — NEOSTIGMINE METHYLSULFATE 1 MG/ML
INJECTION, SOLUTION INTRAVENOUS
Status: DISCONTINUED | OUTPATIENT
Start: 2018-06-29 | End: 2018-06-29

## 2018-06-29 RX ORDER — FAMOTIDINE 10 MG/ML
INJECTION INTRAVENOUS
Status: DISPENSED
Start: 2018-06-29 | End: 2018-06-30

## 2018-06-29 RX ORDER — SODIUM CHLORIDE 9 MG/ML
INJECTION, SOLUTION INTRAVENOUS CONTINUOUS
Status: ACTIVE | OUTPATIENT
Start: 2018-06-29 | End: 2018-06-29

## 2018-06-29 RX ORDER — OXYCODONE HYDROCHLORIDE 5 MG/1
5 TABLET ORAL EVERY 6 HOURS PRN
Qty: 15 TABLET | Refills: 0 | Status: SHIPPED | OUTPATIENT
Start: 2018-06-29 | End: 2020-05-27

## 2018-06-29 RX ORDER — IBUPROFEN 200 MG
16 TABLET ORAL
Status: DISCONTINUED | OUTPATIENT
Start: 2018-06-29 | End: 2018-06-30 | Stop reason: HOSPADM

## 2018-06-29 RX ORDER — SODIUM CHLORIDE 0.9 % (FLUSH) 0.9 %
5 SYRINGE (ML) INJECTION
Status: DISCONTINUED | OUTPATIENT
Start: 2018-06-29 | End: 2018-06-30 | Stop reason: HOSPADM

## 2018-06-29 RX ADMIN — ONDANSETRON 4 MG: 2 INJECTION INTRAMUSCULAR; INTRAVENOUS at 11:06

## 2018-06-29 RX ADMIN — CIPROFLOXACIN 400 MG: 2 INJECTION, SOLUTION INTRAVENOUS at 11:06

## 2018-06-29 RX ADMIN — PROPOFOL 30 MG: 10 INJECTION, EMULSION INTRAVENOUS at 12:06

## 2018-06-29 RX ADMIN — SODIUM CHLORIDE: 0.9 INJECTION, SOLUTION INTRAVENOUS at 01:06

## 2018-06-29 RX ADMIN — SODIUM CHLORIDE: 0.9 INJECTION, SOLUTION INTRAVENOUS at 10:06

## 2018-06-29 RX ADMIN — SODIUM CHLORIDE: 0.9 INJECTION, SOLUTION INTRAVENOUS at 03:06

## 2018-06-29 RX ADMIN — MIDAZOLAM HYDROCHLORIDE 2 MG: 1 INJECTION, SOLUTION INTRAMUSCULAR; INTRAVENOUS at 11:06

## 2018-06-29 RX ADMIN — FENTANYL CITRATE 50 MCG: 50 INJECTION, SOLUTION INTRAMUSCULAR; INTRAVENOUS at 11:06

## 2018-06-29 RX ADMIN — ROCURONIUM BROMIDE 10 MG: 10 INJECTION, SOLUTION INTRAVENOUS at 12:06

## 2018-06-29 RX ADMIN — FENTANYL CITRATE 25 MCG: 50 INJECTION INTRAMUSCULAR; INTRAVENOUS at 02:06

## 2018-06-29 RX ADMIN — PHENYLEPHRINE HYDROCHLORIDE 100 MCG: 10 INJECTION INTRAVENOUS at 12:06

## 2018-06-29 RX ADMIN — NEOSTIGMINE METHYLSULFATE 5 MG: 1 INJECTION INTRAVENOUS at 01:06

## 2018-06-29 RX ADMIN — FENTANYL CITRATE 50 MCG: 50 INJECTION, SOLUTION INTRAMUSCULAR; INTRAVENOUS at 12:06

## 2018-06-29 RX ADMIN — ROCURONIUM BROMIDE 10 MG: 10 INJECTION, SOLUTION INTRAVENOUS at 11:06

## 2018-06-29 RX ADMIN — ROCURONIUM BROMIDE 30 MG: 10 INJECTION, SOLUTION INTRAVENOUS at 11:06

## 2018-06-29 RX ADMIN — GLYCOPYRROLATE 0.6 MG: 0.2 INJECTION, SOLUTION INTRAMUSCULAR; INTRAVENOUS at 01:06

## 2018-06-29 RX ADMIN — PROPOFOL 100 MG: 10 INJECTION, EMULSION INTRAVENOUS at 11:06

## 2018-06-29 RX ADMIN — FAMOTIDINE 20 MG: 10 INJECTION, SOLUTION INTRAVENOUS at 02:06

## 2018-06-29 RX ADMIN — LIDOCAINE HYDROCHLORIDE 70 MG: 20 INJECTION, SOLUTION INTRAVENOUS at 11:06

## 2018-06-29 RX ADMIN — FENTANYL CITRATE 25 MCG: 50 INJECTION INTRAMUSCULAR; INTRAVENOUS at 01:06

## 2018-06-29 RX ADMIN — DEXAMETHASONE SODIUM PHOSPHATE 4 MG: 4 INJECTION, SOLUTION INTRAMUSCULAR; INTRAVENOUS at 11:06

## 2018-06-29 RX ADMIN — OXYCODONE HYDROCHLORIDE 5 MG: 5 TABLET ORAL at 03:06

## 2018-06-29 RX ADMIN — METRONIDAZOLE 500 MG: 500 SOLUTION INTRAVENOUS at 11:06

## 2018-06-29 RX ADMIN — OXYCODONE HYDROCHLORIDE 5 MG: 5 TABLET ORAL at 11:06

## 2018-06-29 NOTE — PROGRESS NOTES
"Complaining of heart burn.  States, "I suffer from heart burn".  Order received to give Pepcid 20mg IVP now per Dr. Ybarra.  "

## 2018-06-29 NOTE — ANESTHESIA PREPROCEDURE EVALUATION
06/29/2018  Glory Chawla is a 63 y.o., female.    Anesthesia Evaluation    I have reviewed the Patient Summary Reports.     I have reviewed the Medications.     Review of Systems  Anesthesia Hx:  No problems with previous Anesthesia    Social:  Smoker, Social Alcohol Use    Hematology/Oncology:  Hematology Normal   Oncology Normal     EENT/Dental:EENT/Dental Normal   Cardiovascular:  Cardiovascular Normal Exercise tolerance: good     Pulmonary:  Pulmonary Normal    Renal/:  Renal/ Normal     Hepatic/GI:   Liver Disease, Hepatitis, C    Musculoskeletal:  Musculoskeletal Normal    Neurological:  Neurology Normal    Endocrine:  Endocrine Normal    Dermatological:  Skin Normal    Psych:  Psychiatric Normal           Physical Exam  General:  Well nourished    Airway/Jaw/Neck:  Airway Findings: Mouth Opening: Normal Tongue: Normal  General Airway Assessment: Adult  Mallampati: II  TM Distance: Normal, at least 6 cm  Jaw/Neck Findings:  Neck ROM: Normal ROM      Dental:  Dental Findings: In tact        Mental Status:  Mental Status Findings:  Cooperative, Alert and Oriented         Anesthesia Plan  Type of Anesthesia, risks & benefits discussed:  Anesthesia Type:  general  Patient's Preference: GA  Intra-op Monitoring Plan: standard ASA monitors  Intra-op Monitoring Plan Comments:   Post Op Pain Control Plan: multimodal analgesia  Post Op Pain Control Plan Comments:   Induction:   IV  Beta Blocker:  Patient is not currently on a Beta-Blocker (No further documentation required).       Informed Consent: Patient understands risks and agrees with Anesthesia plan.  Questions answered. Anesthesia consent signed with patient.  ASA Score: 3     Day of Surgery Review of History & Physical:  There are no significant changes.  H&P update referred to the surgeon.         Ready For Surgery From Anesthesia  Perspective.

## 2018-06-29 NOTE — PLAN OF CARE
Problem: Patient Care Overview  Goal: Plan of Care Review  Outcome: Ongoing (interventions implemented as appropriate)  Pt's VSS, NAD Noted. Right abdominal incision is clean dry and intact. Pt c/o 8/10 right flank pain. Nurse will administer PRN pain medication and reasess. Bed locked in lowest position, side rails upx2, call bell within reach. Pt tolerating liquids and awaiting dinner tray. Nonskid socks on pt.

## 2018-06-29 NOTE — TRANSFER OF CARE
"Anesthesia Transfer of Care Note    Patient: Glory Chawla    Procedure(s) Performed: Procedure(s) (LRB):  ABLATION, RADIOFREQUENCY (N/A)    Patient location: [facility]    Anesthesia Type: general    Transport from OR: Transported from OR on 6-10 L/min O2 by face mask with adequate spontaneous ventilation    Post pain: adequate analgesia    Post assessment: no apparent anesthetic complications    Post vital signs: stable    Level of consciousness: awake and alert    Nausea/Vomiting: no nausea/vomiting    Complications: none    Transfer of care protocol was followed      Last vitals:   Visit Vitals  /75 (BP Location: Right arm, Patient Position: Lying)   Pulse 84   Resp 17   Ht 5' 3" (1.6 m)   Wt 74.8 kg (165 lb)   SpO2 100%   BMI 29.23 kg/m²     "

## 2018-06-29 NOTE — PROGRESS NOTES
MWA completed, pt tolerated well. No apparent distress noted. Dressing applied CDI. Pt to be transferred to PACU, accompanied by anesthesia.

## 2018-06-29 NOTE — PROGRESS NOTES
Pt brought to  with ELISEO Barbosa for CT-guided microwave ablation of Right hepatic lobe lesion.  Name verified using two identifiers.  Allergies verified.  Pt stated she does not have a ride home after procedure.  Dr. Overton informed of findings. Dr. Overton explained to patient of plan.   Pt to be scanned first prior to anesthesia sedation.  Proceeding with procedure will depend on CT scan.

## 2018-06-29 NOTE — PLAN OF CARE
"During pre-op assessment, patient stopped answering questions and refused to answer. Patient states, "I'm not feeling well today." When asked what is bothering her and what she meant by "not feeling well," patient repeated in a loud voice, "I am not feeling good." When asked if she wanted to reschedule her procedure today since she wasn't feeling well, patient loudly asked if she was being refused care. Stated, no that just asking since she stated she was not feeling well. Patient refused to explain what she meant by "not feeling well" and any other questions. Patient requested to speak to a supervisor and refused IV. [facility] OC notified. IR notified.  "

## 2018-06-29 NOTE — PROGRESS NOTES
Pt arrived to OBS 1 via wheelchair with transport tech. Pt ambulated to bathroom with stand by assist and even gait. NAD noted.

## 2018-06-30 VITALS
BODY MASS INDEX: 29.23 KG/M2 | OXYGEN SATURATION: 99 % | DIASTOLIC BLOOD PRESSURE: 86 MMHG | RESPIRATION RATE: 16 BRPM | SYSTOLIC BLOOD PRESSURE: 148 MMHG | HEIGHT: 63 IN | HEART RATE: 86 BPM | WEIGHT: 165 LBS | TEMPERATURE: 97 F

## 2018-06-30 PROCEDURE — 25000003 PHARM REV CODE 250: Performed by: STUDENT IN AN ORGANIZED HEALTH CARE EDUCATION/TRAINING PROGRAM

## 2018-06-30 PROCEDURE — S0028 INJECTION, FAMOTIDINE, 20 MG: HCPCS | Performed by: RADIOLOGY

## 2018-06-30 PROCEDURE — G0378 HOSPITAL OBSERVATION PER HR: HCPCS

## 2018-06-30 PROCEDURE — 25000003 PHARM REV CODE 250: Performed by: RADIOLOGY

## 2018-06-30 RX ADMIN — FAMOTIDINE 20 MG: 10 INJECTION, SOLUTION INTRAVENOUS at 09:06

## 2018-06-30 RX ADMIN — OXYCODONE HYDROCHLORIDE 5 MG: 5 TABLET ORAL at 09:06

## 2018-06-30 NOTE — NURSING
Discharge instructions, prescriptions and medlist given.  Patient and family verbalized understanding.  Reports relief from pain meds.  Awaiting transport for discharge.

## 2018-06-30 NOTE — PLAN OF CARE
Problem: Patient Care Overview  Goal: Plan of Care Review  Outcome: Ongoing (interventions implemented as appropriate)  Pt remains free of falls and injuries, pain under control, pt remains free of infection,

## 2018-07-02 ENCOUNTER — RESEARCH ENCOUNTER (OUTPATIENT)
Dept: RESEARCH | Facility: HOSPITAL | Age: 63
End: 2018-07-02

## 2018-07-02 DIAGNOSIS — C22.0 HCC (HEPATOCELLULAR CARCINOMA): Primary | ICD-10-CM

## 2018-07-02 NOTE — ANESTHESIA POSTPROCEDURE EVALUATION
"Anesthesia Post Evaluation    Patient: Glory Chawla    Procedure(s) Performed: Procedure(s) (LRB):  ABLATION, RADIOFREQUENCY (N/A)    Final Anesthesia Type: general  Patient location during evaluation: PACU  Patient participation: Yes- Able to Participate  Level of consciousness: awake and alert  Post-procedure vital signs: reviewed and stable  Pain management: adequate  Airway patency: patent  PONV status at discharge: No PONV  Anesthetic complications: no      Cardiovascular status: blood pressure returned to baseline  Respiratory status: unassisted  Hydration status: euvolemic  Follow-up not needed.        Visit Vitals  BP (!) 148/86 (BP Location: Left arm, Patient Position: Lying)   Pulse 86   Temp 35.9 °C (96.6 °F) (Oral)   Resp 16   Ht 5' 3" (1.6 m)   Wt 74.8 kg (165 lb)   SpO2 99%   Breastfeeding? No   BMI 29.23 kg/m²       Pain/Issac Score: No Data Recorded      "

## 2018-07-02 NOTE — PROGRESS NOTES
RESEARCH STUDY CONSENT ENCOUNTER  ORGAN TRANSPLANT  [facility]LAN PRATER    Study: Role of Tumor-Induced Immune Tolerance in the Patient Response to Locoregional Therapy: Implications in Assessment Risk of Hepatocellular Carcinoma Recurrence Following Liver Transplantation    IRB Approval #: [id number]    : Ricardo Paredes MD  Sub-investigator: Huey Squires, PhD    This study is an observational study to evaluate patients receiving transarterial chemoembolization (TACE) or transarterial radioembolization (TARE) therapy to define the link between tumor-elicited peripheral cell populations, and the risk of hepatocellular carcinoma (HCC) recurrence before orthotopic liver transplantation (OLT). [IRB 2016.131.B]      The study was explained and the Informed Consent was reviewed and discussed with Glory Chawla.  All risks, benefits, and procedures were discussed.  Patient strongly denied participation in the study.    Karely Marsh Research

## 2018-07-03 NOTE — DISCHARGE SUMMARY
Radiology Discharge Summary      Hospital Course: No complications    Admit Date: 6/29/2018  Discharge Date: 07/03/2018     Instructions Given to Patient: Yes  Diet: Resume prior diet  Activity: activity as tolerated     Description of Condition on Discharge: Stable  Vital Signs (Most Recent): Temp: 96.6 °F (35.9 °C) (06/30/18 0737)  Pulse: 86 (06/30/18 0737)  Resp: 16 (06/30/18 0737)  BP: (!) 148/86 (06/30/18 0737)  SpO2: 99 % (06/30/18 0737)    Discharge Disposition: Home    Discharge Diagnosis:   HCC    Procedure: Microwave ablation     Follow-up:     Follow up imaging in 1 month.    Huey Overton MD  Department of Radiology  Pager: [phone]

## 2018-08-30 ENCOUNTER — OFFICE VISIT (OUTPATIENT)
Dept: INTERVENTIONAL RADIOLOGY/VASCULAR | Facility: CLINIC | Age: 63
End: 2018-08-30
Payer: MEDICAID

## 2018-08-30 ENCOUNTER — HOSPITAL ENCOUNTER (OUTPATIENT)
Dept: RADIOLOGY | Facility: HOSPITAL | Age: 63
Discharge: HOME | End: 2018-08-30
Attending: FAMILY MEDICINE
Payer: MEDICAID

## 2018-08-30 VITALS
SYSTOLIC BLOOD PRESSURE: 136 MMHG | HEIGHT: 63 IN | BODY MASS INDEX: 28.9 KG/M2 | WEIGHT: 163.13 LBS | HEART RATE: 117 BPM | DIASTOLIC BLOOD PRESSURE: 89 MMHG

## 2018-08-30 DIAGNOSIS — C22.0 HCC (HEPATOCELLULAR CARCINOMA): Primary | ICD-10-CM

## 2018-08-30 DIAGNOSIS — C22.0 HCC (HEPATOCELLULAR CARCINOMA): ICD-10-CM

## 2018-08-30 LAB
CREAT SERPL-MCNC: 1 MG/DL (ref 0.5–1.4)
SAMPLE: NORMAL

## 2018-08-30 PROCEDURE — 74183 MRI ABD W/O CNTR FLWD CNTR: CPT | Mod: 26,,, | Performed by: RADIOLOGY

## 2018-08-30 PROCEDURE — 74183 MRI ABD W/O CNTR FLWD CNTR: CPT | Mod: TC

## 2018-08-30 PROCEDURE — 99999 PR PBB SHADOW E&M-EST. PATIENT-LVL II: CPT | Mod: PBBFAC,,,

## 2018-08-30 PROCEDURE — 25500020 PHARM REV CODE 255: Performed by: FAMILY MEDICINE

## 2018-08-30 PROCEDURE — 99212 OFFICE O/P EST SF 10 MIN: CPT | Mod: S$PBB,,, | Performed by: FAMILY MEDICINE

## 2018-08-30 PROCEDURE — A9585 GADOBUTROL INJECTION: HCPCS | Performed by: FAMILY MEDICINE

## 2018-08-30 PROCEDURE — 99212 OFFICE O/P EST SF 10 MIN: CPT | Mod: PBBFAC,25

## 2018-08-30 RX ORDER — GADOBUTROL 604.72 MG/ML
10 INJECTION INTRAVENOUS
Status: COMPLETED | OUTPATIENT
Start: 2018-08-30 | End: 2018-08-30

## 2018-08-30 RX ADMIN — GADOBUTROL 10 ML: 604.72 INJECTION INTRAVENOUS at 10:08

## 2018-09-04 NOTE — PROGRESS NOTES
Subjective:       Patient ID: Glory Chawla is a 63 y.o. female.    Chief Complaint: Hepatocellular Carcinoma    Patient here for follow up of hepatocellular carcinoma recently treated with microwave ablation on 6/29/2018. She reports feeling well. She denies any abdominal pain or distention. She had an MRI this morning.       Review of Systems   Constitutional: Negative for activity change, appetite change, chills, fatigue and fever.   Respiratory: Negative for cough, shortness of breath, wheezing and stridor.    Cardiovascular: Negative for chest pain, palpitations and leg swelling.   Gastrointestinal: Negative for abdominal distention, abdominal pain, constipation, diarrhea, nausea and vomiting.       Objective:      Physical Exam   Constitutional: She is oriented to person, place, and time. She appears well-developed and well-nourished. No distress.   Cardiovascular: Normal rate, regular rhythm and normal heart sounds. Exam reveals no gallop and no friction rub.   No murmur heard.  Pulmonary/Chest: Effort normal and breath sounds normal. No stridor. No respiratory distress. She has no wheezes. She has no rales.   Abdominal: Soft. Bowel sounds are normal. She exhibits no distension and no mass. There is no tenderness. There is no guarding.   Neurological: She is alert and oriented to person, place, and time.   Skin: Skin is warm and dry. She is not diaphoretic.   Psychiatric: She has a normal mood and affect.   Vitals reviewed.      Assessment:       1. HCC (hepatocellular carcinoma)        Plan:         Reviewed MRI results with patient. Explained treated area shows no residual. Recommendation is to repeat MRI in 3 months. Patient verbalized understanding and agreement.

## 2018-11-07 DIAGNOSIS — D49.0 LIVER NEOPLASM: ICD-10-CM

## 2018-12-04 ENCOUNTER — TELEPHONE (OUTPATIENT)
Dept: HEPATOLOGY | Facility: CLINIC | Age: 63
End: 2018-12-04

## 2018-12-04 NOTE — TELEPHONE ENCOUNTER
Glory Chawla did not show up for her appointment at Hepatology on 12/04/2018   I will request our staffs to follow up with patient and reschedule.

## 2018-12-06 NOTE — TELEPHONE ENCOUNTER
"Attempted to contact pt to reschedule missed appts. Per pt's chart, "Pt request that she not be called about appts; to just anju them 2wks out and send it in the mail."     Pt's phone does receive incoming calls. Emergency contact number listed for pt's sister is not in service.     Will mail new appts to pt along with a letter from dept. Letter sent certified mail.    [facility]  "

## 2019-01-04 ENCOUNTER — HOSPITAL ENCOUNTER (OUTPATIENT)
Dept: RADIOLOGY | Facility: HOSPITAL | Age: 64
Discharge: HOME | End: 2019-01-04
Attending: INTERNAL MEDICINE
Payer: MEDICAID

## 2019-01-04 ENCOUNTER — OFFICE VISIT (OUTPATIENT)
Dept: HEPATOLOGY | Facility: CLINIC | Age: 64
End: 2019-01-04
Payer: MEDICAID

## 2019-01-04 ENCOUNTER — TELEPHONE (OUTPATIENT)
Dept: HEPATOLOGY | Facility: CLINIC | Age: 64
End: 2019-01-04

## 2019-01-04 VITALS
RESPIRATION RATE: 20 BRPM | TEMPERATURE: 98 F | HEART RATE: 100 BPM | BODY MASS INDEX: 27.77 KG/M2 | SYSTOLIC BLOOD PRESSURE: 140 MMHG | WEIGHT: 156.75 LBS | OXYGEN SATURATION: 97 % | HEIGHT: 63 IN | DIASTOLIC BLOOD PRESSURE: 79 MMHG

## 2019-01-04 DIAGNOSIS — D49.0 LIVER NEOPLASM: ICD-10-CM

## 2019-01-04 DIAGNOSIS — K74.69 COMPENSATED CIRRHOSIS RELATED TO HEPATITIS C VIRUS (HCV): ICD-10-CM

## 2019-01-04 DIAGNOSIS — B19.20 COMPENSATED CIRRHOSIS RELATED TO HEPATITIS C VIRUS (HCV): ICD-10-CM

## 2019-01-04 DIAGNOSIS — C22.0 HCC (HEPATOCELLULAR CARCINOMA): Primary | ICD-10-CM

## 2019-01-04 PROBLEM — B20 HIV (HUMAN IMMUNODEFICIENCY VIRUS INFECTION): Status: ACTIVE | Noted: 2019-01-04

## 2019-01-04 PROBLEM — R16.0 LIVER MASS: Status: RESOLVED | Noted: 2018-02-09 | Resolved: 2019-01-04

## 2019-01-04 LAB
CREAT SERPL-MCNC: 1 MG/DL (ref 0.5–1.4)
SAMPLE: NORMAL

## 2019-01-04 PROCEDURE — 99999 PR PBB SHADOW E&M-EST. PATIENT-LVL IV: CPT | Mod: PBBFAC,,, | Performed by: INTERNAL MEDICINE

## 2019-01-04 PROCEDURE — 25500020 PHARM REV CODE 255: Performed by: INTERNAL MEDICINE

## 2019-01-04 PROCEDURE — 74183 MRI ABD W/O CNTR FLWD CNTR: CPT | Mod: 26,,, | Performed by: RADIOLOGY

## 2019-01-04 PROCEDURE — 99214 PR OFFICE/OUTPT VISIT, EST, LEVL IV, 30-39 MIN: ICD-10-PCS | Mod: S$PBB,,, | Performed by: INTERNAL MEDICINE

## 2019-01-04 PROCEDURE — 99999 PR PBB SHADOW E&M-EST. PATIENT-LVL IV: ICD-10-PCS | Mod: PBBFAC,,, | Performed by: INTERNAL MEDICINE

## 2019-01-04 PROCEDURE — 74183 MRI ABD W/O CNTR FLWD CNTR: CPT | Mod: TC

## 2019-01-04 PROCEDURE — A9585 GADOBUTROL INJECTION: HCPCS | Performed by: INTERNAL MEDICINE

## 2019-01-04 PROCEDURE — 99214 OFFICE O/P EST MOD 30 MIN: CPT | Mod: S$PBB,,, | Performed by: INTERNAL MEDICINE

## 2019-01-04 PROCEDURE — 74183 MRI ABDOMEN W WO CONTRAST: ICD-10-PCS | Mod: 26,,, | Performed by: RADIOLOGY

## 2019-01-04 PROCEDURE — 99214 OFFICE O/P EST MOD 30 MIN: CPT | Mod: PBBFAC,25 | Performed by: INTERNAL MEDICINE

## 2019-01-04 RX ORDER — GADOBUTROL 604.72 MG/ML
10 INJECTION INTRAVENOUS
Status: COMPLETED | OUTPATIENT
Start: 2019-01-04 | End: 2019-01-04

## 2019-01-04 RX ADMIN — GADOBUTROL 10 ML: 604.72 INJECTION INTRAVENOUS at 10:01

## 2019-01-04 NOTE — TELEPHONE ENCOUNTER
Patient: Glory Chawla       MRN: [id number]      : 1955     Age: 63 y.o.  [address] [place] [address]  [address]  [place] [address]    Provider: Hepatologist Wayne Whitley    Urgency of review: urgent    Patient Transplant Status: Not a candidate - ongoing alcohol use, noncompliance    Reason for presentation: Reassessment    Clinical Summary:   63 F, HIV/HCV, s/p HCV treatment and SVR. Still drinks alcohol - high PETH. Some missed appointments.  HCC - initial lesion 1 cm    TACE/Ablation done.    19: MRI: Small area (1.8 cm) of arterial phase enhancement adjacent to the right hepatic lobe ablation site with probable mild washout on delayed imaging, concerning for residual/ recurrent disease.    Plt: 83, AFP: 9    Imaging to be reviewed: MRI    HCC Treatment History: TACE  and MW ablation     ABO: O POS    Platelets:   Lab Results   Component Value Date/Time    PLT 83 (L) 2019 11:18 AM     Creatinine:   Lab Results   Component Value Date/Time    CREATININE 0.9 2019 11:18 AM     Bilirubin:   Lab Results   Component Value Date/Time    BILITOT 0.8 2019 11:18 AM     AFP Last 3 each if available:   Lab Results   Component Value Date/Time    AFP 7.3 2019 11:18 AM    AFP 9.0 (H) 2018 05:09 PM       MELD: MELD-Na score: 7 at 2019 11:18 AM  MELD score: 7 at 2019 11:18 AM  Calculated from:  Serum Creatinine: 0.9 mg/dL (Rounded to 1 mg/dL) at 2019 11:18 AM  Serum Sodium: 139 mmol/L (Rounded to 137 mmol/L) at 2019 11:18 AM  Total Bilirubin: 0.8 mg/dL (Rounded to 1 mg/dL) at 2019 11:18 AM  INR(ratio): 1.1 at 2019 11:18 AM  Age: 63 years    Plan:     Follow-up Provider:
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
## 2019-01-04 NOTE — LETTER
January 4, 2019      Naveed Benitez MD  [address] [facility] [address]           [facility] - Hepatology  [address]  [facility] [address]  Phone: [phone]  Fax: [phone]          Patient: Glory Chawla   MR Number: [id number]   YOB: 1955   Date of Visit: 1/4/2019       Dear No ref. provider found:    Thank you for referring Glory Chawla to me for evaluation. Attached you will find relevant portions of my assessment and plan of care.    If you have questions, please do not hesitate to call me. I look forward to following Glory Chawla along with you.    Sincerely,    Lida Whitley MD    Enclosure  CC:  No Recipients    If you would like to receive this communication electronically, please contact [email or web address] or [phone] to request more information on [facility] access.    For providers and/or their staff who would like to refer a patient to [facility], please contact us through our one-stop-shop provider referral line, [facility], at [phone].    If you feel you have received this communication in error or would no longer like to receive these types of communications, please e-mail [email or web address]

## 2019-01-08 ENCOUNTER — CONFERENCE (OUTPATIENT)
Dept: TRANSPLANT | Facility: CLINIC | Age: 64
End: 2019-01-08

## 2019-01-09 NOTE — TELEPHONE ENCOUNTER
Patient: Glory Chawla       MRN: [id number]      : 1955     Age: 63 y.o.  [address] [place]  [address]  [place] [address]    Provider: Hepatologist Wayne Whitley    Urgency of review: urgent    Patient Transplant Status: Not a candidate - ongoing alcohol use, noncompliance    Reason for presentation: Reassessment    Clinical Summary:   63 F, HIV/HCV, s/p HCV treatment and SVR. Still drinks alcohol - high PETH. Some missed appointments.  HCC - initial lesion 1 cm    TACE/Ablation done.    19: MRI: Small area (1.8 cm) of arterial phase enhancement adjacent to the right hepatic lobe ablation site with probable mild washout on delayed imaging, concerning for residual/ recurrent disease.    Plt: 83, AFP: 9    Imaging to be reviewed: MRI    HCC Treatment History: TACE  and MW ablation     ABO: O POS    Platelets:   Lab Results   Component Value Date/Time    PLT 83 (L) 2019 11:18 AM     Creatinine:   Lab Results   Component Value Date/Time    CREATININE 0.9 2019 11:18 AM     Bilirubin:   Lab Results   Component Value Date/Time    BILITOT 0.8 2019 11:18 AM     AFP Last 3 each if available:   Lab Results   Component Value Date/Time    AFP 7.3 2019 11:18 AM    AFP 9.0 (H) 2018 05:09 PM       MELD: MELD-Na score: 7 at 2019 11:18 AM  MELD score: 7 at 2019 11:18 AM  Calculated from:  Serum Creatinine: 0.9 mg/dL (Rounded to 1 mg/dL) at 2019 11:18 AM  Serum Sodium: 139 mmol/L (Rounded to 137 mmol/L) at 2019 11:18 AM  Total Bilirubin: 0.8 mg/dL (Rounded to 1 mg/dL) at 2019 11:18 AM  INR(ratio): 1.1 at 2019 11:18 AM  Age: 63 years    Plan: 1.8cm focus of residual/recurrent disease adjacent to the ablation margin.  Recommend repeat treatment.    Enhancement and wash out of residual outside ablation zone.  Gallbladder very close to residual area. Discuss with Dr. Overton--likely IR consult for y90.     Dr. Whitley to discuss possible surgical ablation option with   [facility].      Follow-up Provider: Lida Whitley MD

## 2019-01-10 NOTE — TELEPHONE ENCOUNTER
Called patient multiple times, no answer. Left a voicemail.    We will send certified mails with recommendation for retreatment of liver cancer.

## 2019-02-04 ENCOUNTER — INITIAL CONSULT (OUTPATIENT)
Dept: INTERVENTIONAL RADIOLOGY/VASCULAR | Facility: CLINIC | Age: 64
End: 2019-02-04
Payer: MEDICAID

## 2019-02-04 VITALS
HEART RATE: 101 BPM | DIASTOLIC BLOOD PRESSURE: 80 MMHG | WEIGHT: 154.19 LBS | SYSTOLIC BLOOD PRESSURE: 115 MMHG | BODY MASS INDEX: 27.32 KG/M2 | HEIGHT: 63 IN

## 2019-02-04 DIAGNOSIS — C22.0 HCC (HEPATOCELLULAR CARCINOMA): Primary | ICD-10-CM

## 2019-02-04 PROCEDURE — 99213 PR OFFICE/OUTPT VISIT, EST, LEVL III, 20-29 MIN: ICD-10-PCS | Mod: S$PBB,,, | Performed by: FAMILY MEDICINE

## 2019-02-04 PROCEDURE — 99999 PR PBB SHADOW E&M-EST. PATIENT-LVL III: CPT | Mod: PBBFAC,,,

## 2019-02-04 PROCEDURE — 99213 OFFICE O/P EST LOW 20 MIN: CPT | Mod: PBBFAC

## 2019-02-04 PROCEDURE — 99999 PR PBB SHADOW E&M-EST. PATIENT-LVL III: ICD-10-PCS | Mod: PBBFAC,,,

## 2019-02-04 PROCEDURE — 99213 OFFICE O/P EST LOW 20 MIN: CPT | Mod: S$PBB,,, | Performed by: FAMILY MEDICINE

## 2019-02-04 NOTE — PROGRESS NOTES
Subjective:       Patient ID: Glory Chawla is a 63 y.o. female.    Chief Complaint: Hepatocellular Carcinoma    Patient here for follow up of hepatocellular carcinoma recently treated with microwave ablation on 2018. She reports feeling well. She denies any abdominal pain or distention. She was reviewed in liver conference.     She also admits that she has been trying to quit drinking alcohol, but it has been very difficult for her. She asks about a medication that would make her sick when alcohol is consumed.       Review of Systems   Constitutional: Negative for activity change, appetite change, chills, fatigue and fever.   Respiratory: Negative for cough, shortness of breath, wheezing and stridor.    Cardiovascular: Negative for chest pain, palpitations and leg swelling.   Gastrointestinal: Negative for abdominal distention, abdominal pain, constipation, diarrhea, nausea and vomiting.       Objective:      Physical Exam   Constitutional: She is oriented to person, place, and time. She appears well-developed and well-nourished. No distress.   Cardiovascular: Normal rate, regular rhythm and normal heart sounds. Exam reveals no gallop and no friction rub.   No murmur heard.  Pulmonary/Chest: Effort normal and breath sounds normal. No stridor. No respiratory distress. She has no wheezes. She has no rales.   Abdominal: Soft. Bowel sounds are normal. She exhibits no distension and no mass. There is no tenderness. There is no guarding.   Neurological: She is alert and oriented to person, place, and time.   Skin: Skin is warm and dry. She is not diaphoretic.   Psychiatric: She has a normal mood and affect.   Vitals reviewed.      ECO  MELD-Na score: 7 at 2019 11:18 AM  MELD score: 7 at 2019 11:18 AM  Calculated from:  Serum Creatinine: 0.9 mg/dL (Rounded to 1 mg/dL) at 2019 11:18 AM  Serum Sodium: 139 mmol/L (Rounded to 137 mmol/L) at 2019 11:18 AM  Total Bilirubin: 0.8 mg/dL (Rounded to  1 mg/dL) at 1/4/2019 11:18 AM  INR(ratio): 1.1 at 1/4/2019 11:18 AM  Age: 63 years  Transplant Status: not a candidate     Imaging: MRI 1/4/2019  Impression       Small area (1.8 cm) of arterial phase enhancement adjacent to the right hepatic lobe ablation site with probable mild washout on delayed imaging, concerning for residual/ recurrent disease.     Assessment:       1. HCC (hepatocellular carcinoma)        Plan:         Reviewed MRI impression with patient. Explained recommendation is to treat with radioembolization due to proximity of recurrence to gall bladder. Yttrium 90 Radioembolization discussed in detail with the patient including risks, benefits, potential complications, usual pre and post procedure course.  Discussed the need for initial Angiogram mapping study prior to scheduling the actual Radioembolization procedure. Utilized images from the internet and illustrations to help explain procedure. Patient verbalized understanding and agreement. Dr. Perez in room. Patient consented. We will call to schedule once the physician's schedule has been posted. Clinic phone number provided.    Reassured patient I will message hepatology regarding disulfiram, and if she would be an appropriate candidate for this medication. Confirmed patient's contact phone number in [employer].

## 2019-02-04 NOTE — Clinical Note
Ms. Chawla was asking about a prescription for antabuse. I reassured her I would send a message to you since I do not prescribe this medication, and do not know if she would be a good candidate.Thank you,Cailin

## 2019-02-05 DIAGNOSIS — C22.0 HCC (HEPATOCELLULAR CARCINOMA): Primary | ICD-10-CM

## 2019-02-25 DIAGNOSIS — B19.20 COMPENSATED CIRRHOSIS RELATED TO HEPATITIS C VIRUS (HCV): Primary | ICD-10-CM

## 2019-02-25 DIAGNOSIS — C22.0 HCC (HEPATOCELLULAR CARCINOMA): ICD-10-CM

## 2019-02-25 DIAGNOSIS — K74.69 COMPENSATED CIRRHOSIS RELATED TO HEPATITIS C VIRUS (HCV): Primary | ICD-10-CM

## 2019-02-25 RX ORDER — FENTANYL CITRATE 50 UG/ML
50 INJECTION, SOLUTION INTRAMUSCULAR; INTRAVENOUS
Status: CANCELLED | OUTPATIENT
Start: 2019-02-25

## 2019-02-25 RX ORDER — HEPARIN SODIUM 200 [USP'U]/100ML
500 INJECTION, SOLUTION INTRAVENOUS CONTINUOUS
Status: CANCELLED | OUTPATIENT
Start: 2019-02-25

## 2019-02-25 RX ORDER — MIDAZOLAM HYDROCHLORIDE 1 MG/ML
1 INJECTION INTRAMUSCULAR; INTRAVENOUS
Status: CANCELLED | OUTPATIENT
Start: 2019-02-25

## 2019-03-19 ENCOUNTER — TELEPHONE (OUTPATIENT)
Dept: INTERVENTIONAL RADIOLOGY/VASCULAR | Facility: HOSPITAL | Age: 64
End: 2019-03-19

## 2019-03-19 DIAGNOSIS — C22.0 HCC (HEPATOCELLULAR CARCINOMA): Primary | ICD-10-CM

## 2019-03-20 ENCOUNTER — HOSPITAL ENCOUNTER (OUTPATIENT)
Facility: HOSPITAL | Age: 64
Discharge: HOME | End: 2019-03-20
Attending: RADIOLOGY | Admitting: RADIOLOGY
Payer: MEDICAID

## 2019-03-20 ENCOUNTER — HOSPITAL ENCOUNTER (OUTPATIENT)
Dept: RADIOLOGY | Facility: HOSPITAL | Age: 64
Discharge: HOME | End: 2019-03-20
Attending: FAMILY MEDICINE | Admitting: RADIOLOGY
Payer: MEDICAID

## 2019-03-20 VITALS
WEIGHT: 157 LBS | OXYGEN SATURATION: 100 % | SYSTOLIC BLOOD PRESSURE: 150 MMHG | TEMPERATURE: 97 F | RESPIRATION RATE: 18 BRPM | HEART RATE: 72 BPM | DIASTOLIC BLOOD PRESSURE: 90 MMHG | HEIGHT: 62 IN | BODY MASS INDEX: 28.89 KG/M2

## 2019-03-20 DIAGNOSIS — C22.0 HCC (HEPATOCELLULAR CARCINOMA): ICD-10-CM

## 2019-03-20 DIAGNOSIS — K76.9 LIVER LESION: ICD-10-CM

## 2019-03-20 PROCEDURE — 25000003 PHARM REV CODE 250: Performed by: NURSE PRACTITIONER

## 2019-03-20 PROCEDURE — 78201 LIVER IMAGING STATIC ONLY: CPT | Mod: 26,,, | Performed by: RADIOLOGY

## 2019-03-20 PROCEDURE — 63600175 PHARM REV CODE 636 W HCPCS: Performed by: NURSE PRACTITIONER

## 2019-03-20 PROCEDURE — 78201 LIVER IMAGING STATIC ONLY: CPT | Mod: TC

## 2019-03-20 PROCEDURE — 78201 NM LIVER IMAGING STATIC PRE Y-90 EMBOLIZATION: ICD-10-PCS | Mod: 26,,, | Performed by: RADIOLOGY

## 2019-03-20 PROCEDURE — 63600175 PHARM REV CODE 636 W HCPCS: Performed by: RADIOLOGY

## 2019-03-20 RX ORDER — FENTANYL CITRATE 50 UG/ML
INJECTION, SOLUTION INTRAMUSCULAR; INTRAVENOUS CODE/TRAUMA/SEDATION MEDICATION
Status: COMPLETED | OUTPATIENT
Start: 2019-03-20 | End: 2019-03-20

## 2019-03-20 RX ORDER — LIDOCAINE HYDROCHLORIDE 10 MG/ML
1 INJECTION, SOLUTION EPIDURAL; INFILTRATION; INTRACAUDAL; PERINEURAL ONCE AS NEEDED
Status: DISCONTINUED | OUTPATIENT
Start: 2019-03-20 | End: 2019-05-03

## 2019-03-20 RX ORDER — SODIUM CHLORIDE 9 MG/ML
INJECTION, SOLUTION INTRAVENOUS CONTINUOUS
Status: DISCONTINUED | OUTPATIENT
Start: 2019-03-20 | End: 2019-05-03

## 2019-03-20 RX ORDER — MIDAZOLAM HYDROCHLORIDE 1 MG/ML
INJECTION INTRAMUSCULAR; INTRAVENOUS CODE/TRAUMA/SEDATION MEDICATION
Status: COMPLETED | OUTPATIENT
Start: 2019-03-20 | End: 2019-03-20

## 2019-03-20 RX ADMIN — FENTANYL CITRATE 50 MCG: 50 INJECTION, SOLUTION INTRAMUSCULAR; INTRAVENOUS at 11:03

## 2019-03-20 RX ADMIN — MIDAZOLAM HYDROCHLORIDE 1 MG: 1 INJECTION, SOLUTION INTRAMUSCULAR; INTRAVENOUS at 12:03

## 2019-03-20 RX ADMIN — FENTANYL CITRATE 50 MCG: 50 INJECTION, SOLUTION INTRAMUSCULAR; INTRAVENOUS at 12:03

## 2019-03-20 RX ADMIN — AMPICILLIN SODIUM AND SULBACTAM SODIUM 3 G: 2; 1 INJECTION, POWDER, FOR SOLUTION INTRAMUSCULAR; INTRAVENOUS at 11:03

## 2019-03-20 RX ADMIN — MIDAZOLAM HYDROCHLORIDE 1 MG: 1 INJECTION, SOLUTION INTRAMUSCULAR; INTRAVENOUS at 11:03

## 2019-03-20 RX ADMIN — SODIUM CHLORIDE: 0.9 INJECTION, SOLUTION INTRAVENOUS at 11:03

## 2019-03-20 NOTE — PROGRESS NOTES
Pt given discharge instructions/handouts. Questions answered. Patient, friends (4), and  all verbalize understanding. PIV dc'd. Pt given all personal items. No acute events. Pt to garage via wheelchair.

## 2019-03-20 NOTE — PROCEDURES
"Radiology Post-Procedure Note    Pre Op Diagnosis: HCC  Post Op Diagnosis: Same    Procedure: Y90 mapping    Procedure performed by: [place]    Written Informed Consent Obtained: Yes  Specimen Removed: NO  Estimated Blood Loss: Minimal    Findings:   Successful mapping of right hepatic lobe for Y90 treatment. At least two vessels will need to be treated arising from the right hepatic lobe. See imaging for further details.    Hemostasis achieved with an exoseal and manual compression. Patient tolerated procedure well.    Judd Garcia MD ([facility])  Radiology PGY-5  [phone]    "

## 2019-03-20 NOTE — DISCHARGE INSTRUCTIONS
For scheduling: Call Kaur at [phone]    For questions or concerns call: MATT MON-FRI 8 AM- 5PM [phone]. Radiology resident on call [phone].    For immediate concerns that are not emergent, you may call our radiology clinic at: [phone]

## 2019-03-20 NOTE — DISCHARGE SUMMARY
"Radiology Discharge Summary      Hospital Course: No complications    Admit Date: 3/20/2019  Discharge Date: 03/20/2019     Instructions Given to Patient: Yes  Diet: Resume prior diet  Activity: activity as tolerated and no driving for today    Description of Condition on Discharge: Stable  Vital Signs (Most Recent): Temp: 97 °F (36.1 °C) (03/20/19 1330)  Pulse: 72 (03/20/19 1515)  Resp: 18 (03/20/19 1515)  BP: (!) 150/90 (03/20/19 1515)  SpO2: 100 % (03/20/19 1515)    Discharge Disposition: Home    Discharge Diagnosis: HCC     Follow-up: will plan on treating 2-3 weeks    Judd Garcia MD ([facility])  Radiology PGY-5  [phone]        "

## 2019-03-20 NOTE — PROGRESS NOTES
Pt arrived to ROCU bed 3 for 2.5 hour post pre yttrium recovery. Report received from TERRELL Christine RN. Pt denies pain/discomfort. Dressing CDI. VSS. No acute events.  to bedside. See flow sheets for post procedure monitoring.
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
## 2019-03-22 LAB — POCT GLUCOSE: 70 MG/DL (ref 70–110)

## 2019-04-25 DIAGNOSIS — C22.0 HCC (HEPATOCELLULAR CARCINOMA): Primary | ICD-10-CM

## 2019-05-03 DIAGNOSIS — C22.0 HCC (HEPATOCELLULAR CARCINOMA): Primary | ICD-10-CM

## 2019-05-03 RX ORDER — HEPARIN SODIUM 200 [USP'U]/100ML
500 INJECTION, SOLUTION INTRAVENOUS CONTINUOUS
Status: CANCELLED | OUTPATIENT
Start: 2019-05-03

## 2019-05-03 RX ORDER — FENTANYL CITRATE 50 UG/ML
50 INJECTION, SOLUTION INTRAMUSCULAR; INTRAVENOUS
Status: CANCELLED | OUTPATIENT
Start: 2019-05-03

## 2019-05-03 RX ORDER — MIDAZOLAM HYDROCHLORIDE 1 MG/ML
1 INJECTION INTRAMUSCULAR; INTRAVENOUS
Status: CANCELLED | OUTPATIENT
Start: 2019-05-03

## 2019-05-06 ENCOUNTER — HOSPITAL ENCOUNTER (OUTPATIENT)
Dept: RADIOLOGY | Facility: HOSPITAL | Age: 64
Discharge: HOME | End: 2019-05-06
Attending: FAMILY MEDICINE
Payer: MEDICAID

## 2019-05-06 ENCOUNTER — HOSPITAL ENCOUNTER (OUTPATIENT)
Facility: HOSPITAL | Age: 64
Discharge: HOME | End: 2019-05-06
Attending: RADIOLOGY | Admitting: RADIOLOGY
Payer: MEDICAID

## 2019-05-06 VITALS
RESPIRATION RATE: 18 BRPM | TEMPERATURE: 97 F | WEIGHT: 153 LBS | DIASTOLIC BLOOD PRESSURE: 82 MMHG | OXYGEN SATURATION: 98 % | BODY MASS INDEX: 28.16 KG/M2 | SYSTOLIC BLOOD PRESSURE: 135 MMHG | HEIGHT: 62 IN | HEART RATE: 103 BPM

## 2019-05-06 DIAGNOSIS — C22.0 HCC (HEPATOCELLULAR CARCINOMA): ICD-10-CM

## 2019-05-06 PROCEDURE — 25500020 PHARM REV CODE 255: Performed by: RADIOLOGY

## 2019-05-06 PROCEDURE — 25000003 PHARM REV CODE 250: Performed by: NURSE PRACTITIONER

## 2019-05-06 PROCEDURE — 78201 LIVER IMAGING STATIC ONLY: CPT | Mod: 26,,, | Performed by: RADIOLOGY

## 2019-05-06 PROCEDURE — 63600175 PHARM REV CODE 636 W HCPCS: Performed by: NURSE PRACTITIONER

## 2019-05-06 PROCEDURE — 25000003 PHARM REV CODE 250: Performed by: RADIOLOGY

## 2019-05-06 PROCEDURE — 78201 LIVER IMAGING STATIC ONLY: CPT | Mod: TC

## 2019-05-06 PROCEDURE — 78201 NM LIVER IMAGING STATIC POST Y-90 EMBOLIZATION: ICD-10-PCS | Mod: 26,,, | Performed by: RADIOLOGY

## 2019-05-06 PROCEDURE — 63600175 PHARM REV CODE 636 W HCPCS: Performed by: RADIOLOGY

## 2019-05-06 RX ORDER — MIDAZOLAM HYDROCHLORIDE 1 MG/ML
INJECTION INTRAMUSCULAR; INTRAVENOUS CODE/TRAUMA/SEDATION MEDICATION
Status: COMPLETED | OUTPATIENT
Start: 2019-05-06 | End: 2019-05-06

## 2019-05-06 RX ORDER — DEXAMETHASONE SODIUM PHOSPHATE 100 MG/10ML
INJECTION INTRAMUSCULAR; INTRAVENOUS CODE/TRAUMA/SEDATION MEDICATION
Status: COMPLETED | OUTPATIENT
Start: 2019-05-06 | End: 2019-05-06

## 2019-05-06 RX ORDER — HYDROCODONE BITARTRATE AND ACETAMINOPHEN 5; 325 MG/1; MG/1
2 TABLET ORAL ONCE
Status: DISCONTINUED | OUTPATIENT
Start: 2019-05-06 | End: 2019-05-06

## 2019-05-06 RX ORDER — OXYCODONE AND ACETAMINOPHEN 5; 325 MG/1; MG/1
2 TABLET ORAL ONCE
Status: COMPLETED | OUTPATIENT
Start: 2019-05-06 | End: 2019-05-06

## 2019-05-06 RX ORDER — FENTANYL CITRATE 50 UG/ML
INJECTION, SOLUTION INTRAMUSCULAR; INTRAVENOUS CODE/TRAUMA/SEDATION MEDICATION
Status: COMPLETED | OUTPATIENT
Start: 2019-05-06 | End: 2019-05-06

## 2019-05-06 RX ORDER — METHYLPREDNISOLONE 4 MG/1
TABLET ORAL
Qty: 21 TABLET | Refills: 0 | Status: SHIPPED | OUTPATIENT
Start: 2019-05-06 | End: 2019-05-27

## 2019-05-06 RX ORDER — ONDANSETRON 4 MG/1
4 TABLET, FILM COATED ORAL EVERY 6 HOURS PRN
Qty: 15 TABLET | Refills: 0 | Status: SHIPPED | OUTPATIENT
Start: 2019-05-06 | End: 2020-05-27

## 2019-05-06 RX ORDER — SODIUM CHLORIDE 9 MG/ML
INJECTION, SOLUTION INTRAVENOUS CONTINUOUS
Status: DISCONTINUED | OUTPATIENT
Start: 2019-05-06 | End: 2019-05-06 | Stop reason: HOSPADM

## 2019-05-06 RX ORDER — OXYCODONE HYDROCHLORIDE 5 MG/1
5 TABLET ORAL EVERY 4 HOURS PRN
Qty: 15 TABLET | Refills: 0 | Status: SHIPPED | OUTPATIENT
Start: 2019-05-06

## 2019-05-06 RX ORDER — LIDOCAINE HYDROCHLORIDE 10 MG/ML
1 INJECTION, SOLUTION EPIDURAL; INFILTRATION; INTRACAUDAL; PERINEURAL ONCE AS NEEDED
Status: DISCONTINUED | OUTPATIENT
Start: 2019-05-06 | End: 2019-05-06 | Stop reason: HOSPADM

## 2019-05-06 RX ADMIN — AMPICILLIN AND SULBACTAM 3 G: 2; 1 INJECTION, POWDER, FOR SOLUTION INTRAVENOUS at 11:05

## 2019-05-06 RX ADMIN — DEXAMETHASONE SODIUM PHOSPHATE 20 MG: 10 INJECTION INTRAMUSCULAR; INTRAVENOUS at 03:05

## 2019-05-06 RX ADMIN — OXYCODONE HYDROCHLORIDE AND ACETAMINOPHEN 2 TABLET: 5; 325 TABLET ORAL at 04:05

## 2019-05-06 RX ADMIN — FENTANYL CITRATE 50 MCG: 50 INJECTION, SOLUTION INTRAMUSCULAR; INTRAVENOUS at 03:05

## 2019-05-06 RX ADMIN — SODIUM CHLORIDE: 0.9 INJECTION, SOLUTION INTRAVENOUS at 11:05

## 2019-05-06 RX ADMIN — FENTANYL CITRATE 50 MCG: 50 INJECTION, SOLUTION INTRAMUSCULAR; INTRAVENOUS at 02:05

## 2019-05-06 RX ADMIN — IOHEXOL 175 ML: 300 INJECTION, SOLUTION INTRAVENOUS at 04:05

## 2019-05-06 RX ADMIN — MIDAZOLAM HYDROCHLORIDE 1 MG: 1 INJECTION, SOLUTION INTRAMUSCULAR; INTRAVENOUS at 02:05

## 2019-05-06 NOTE — PROGRESS NOTES
"In to pre op patient for procedure. Patient refuses for me or any nurse in [facility] to start IV. Patient states "they told me to call the people with the light to start my IV."  IR resident at bedside to discuss procedure with patient, patient refusing to sign consent. Patient request I leave the room so she can speak with her family on the phone in private. Provided privacy for patient to speak with her family.  "

## 2019-05-06 NOTE — PROGRESS NOTES
"Spoke with Desirae in IR regarding POC for patient. Desirae states "it will be at least 20-30 more minutes  Before they can take patient for procedure. Updated patient on POC. Patient voiced understanding. S/o remains at bedside. Instructed patient to call for needs. Call bell in reach.   "

## 2019-05-06 NOTE — PROGRESS NOTES
Patient very argumentative and refuses to allow Dr. Overton to explain the consent after he offered to explain multiple times.

## 2019-05-06 NOTE — SEDATION DOCUMENTATION
Yttrium complete. Pt tolerated well. VSS. No signs or symptoms of distress noted.Exoseal closure device in place. Hemostasis 1550. Pt to remain flat until 1750. Pt will be transferred to ROCU bed 4 after nuclear med test and bedside report will be given to RN.

## 2019-05-06 NOTE — DISCHARGE INSTRUCTIONS
" For scheduling: Call Kaur at [phone]    For questions or concerns call: MATT MON-FRI 8 AM- 5PM: [phone].   **After hours and weekends: Call [phone] and ask for "Radiology Resident on call".    For immediate concerns that are not emergent, you may call our radiology clinic at: [phone]                                              "

## 2019-05-06 NOTE — PROGRESS NOTES
Rounded on patient, IV infusing no redness or swelling  Noted. Instructed patient to call for needs. Call bell in reach. Patient voiced understanding.
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
## 2019-05-06 NOTE — PROGRESS NOTES
Y-90 IS complete. Pt tolerated well. Discharge instructions and handouts provided. Pt verbalized understanding. Pt escorted the lobby via wheelchair. Pt discharged

## 2019-05-06 NOTE — PROGRESS NOTES
Rounded on patient.  Assisted patient to the restroom to urinate and returned back to room 17. Updated patient on POC. Patient becoming impatient. Apologized for wait time. Warm blankets provided for comfort. Call bell in reach, instructed patient to call for needs.  Patient  Voiced understanding.

## 2019-05-06 NOTE — PROGRESS NOTES
IR notified that patient is ready for procedure. Unasyn IV infusion hung after speaking to Desirae in IR.

## 2019-05-10 NOTE — PROCEDURES
Radiology Post-Procedure Note    Pre Op Diagnosis: Hepatocellular carcinoma    Post Op Diagnosis: Same    Procedure: Yttrium treatment    Procedure performed by: Huey Overton MD    Written Informed Consent Obtained: Yes    Specimen Removed: No    Estimated Blood Loss: less than 50     Findings:Selective Right hepatic artery radioembolization performed. Please see imaging report for full details.    Patient tolerated procedure well.    Huey Overton MD  Department of Radiology  Pager: [phone]

## 2019-05-10 NOTE — DISCHARGE SUMMARY
Radiology Discharge Summary      Hospital Course: No complications    Admit Date: 5/6/2019  Discharge Date: 05/10/2019     Instructions Given to Patient: Yes  Diet: Resume prior diet  Activity: activity as tolerated and no driving for today    Description of Condition on Discharge: Stable  Vital Signs (Most Recent): Temp: 97.2 °F (36.2 °C) (05/06/19 1600)  Pulse: 103 (05/06/19 1759)  Resp: 18 (05/06/19 1759)  BP: 135/82 (05/06/19 1759)  SpO2: 98 % (05/06/19 1759)    Discharge Disposition: Home    Discharge Diagnosis:   HCC    y 90     Follow-up:   Follow up labs in 1 month.    Huey Overton MD  Department of Radiology  Pager: [phone]

## 2019-05-20 DIAGNOSIS — C22.0 HCC (HEPATOCELLULAR CARCINOMA): Primary | ICD-10-CM

## 2019-06-02 DIAGNOSIS — C22.0 HCC (HEPATOCELLULAR CARCINOMA): Primary | ICD-10-CM

## 2019-07-17 ENCOUNTER — LAB VISIT (OUTPATIENT)
Dept: LAB | Facility: HOSPITAL | Age: 64
End: 2019-07-17
Payer: MEDICAID

## 2019-07-17 ENCOUNTER — OFFICE VISIT (OUTPATIENT)
Dept: INTERVENTIONAL RADIOLOGY/VASCULAR | Facility: CLINIC | Age: 64
End: 2019-07-17
Payer: MEDICAID

## 2019-07-17 VITALS
HEART RATE: 103 BPM | WEIGHT: 156.31 LBS | DIASTOLIC BLOOD PRESSURE: 75 MMHG | BODY MASS INDEX: 28.76 KG/M2 | SYSTOLIC BLOOD PRESSURE: 108 MMHG | HEIGHT: 62 IN

## 2019-07-17 DIAGNOSIS — D49.0 LIVER NEOPLASM: ICD-10-CM

## 2019-07-17 DIAGNOSIS — C22.0 HCC (HEPATOCELLULAR CARCINOMA): ICD-10-CM

## 2019-07-17 LAB
AFP SERPL-MCNC: 13 NG/ML (ref 0–8.4)
ALBUMIN SERPL BCP-MCNC: 3.2 G/DL (ref 3.5–5.2)
ALP SERPL-CCNC: 155 U/L (ref 55–135)
ALT SERPL W/O P-5'-P-CCNC: 87 U/L (ref 10–44)
ANION GAP SERPL CALC-SCNC: 6 MMOL/L (ref 8–16)
AST SERPL-CCNC: 102 U/L (ref 10–40)
BILIRUB SERPL-MCNC: 0.3 MG/DL (ref 0.1–1)
BUN SERPL-MCNC: 16 MG/DL (ref 8–23)
CALCIUM SERPL-MCNC: 9.8 MG/DL (ref 8.7–10.5)
CHLORIDE SERPL-SCNC: 104 MMOL/L (ref 95–110)
CO2 SERPL-SCNC: 27 MMOL/L (ref 23–29)
CREAT SERPL-MCNC: 1.2 MG/DL (ref 0.5–1.4)
EST. GFR  (AFRICAN AMERICAN): 55.2 ML/MIN/1.73 M^2
EST. GFR  (NON AFRICAN AMERICAN): 47.9 ML/MIN/1.73 M^2
GLUCOSE SERPL-MCNC: 113 MG/DL (ref 70–110)
POTASSIUM SERPL-SCNC: 3.9 MMOL/L (ref 3.5–5.1)
PROT SERPL-MCNC: 7.3 G/DL (ref 6–8.4)
SODIUM SERPL-SCNC: 137 MMOL/L (ref 136–145)

## 2019-07-17 PROCEDURE — 82105 ALPHA-FETOPROTEIN SERUM: CPT

## 2019-07-17 PROCEDURE — 99213 OFFICE O/P EST LOW 20 MIN: CPT | Mod: PBBFAC

## 2019-07-17 PROCEDURE — 99213 PR OFFICE/OUTPT VISIT, EST, LEVL III, 20-29 MIN: ICD-10-PCS | Mod: S$PBB,,, | Performed by: RADIOLOGY

## 2019-07-17 PROCEDURE — 80053 COMPREHEN METABOLIC PANEL: CPT

## 2019-07-17 PROCEDURE — 99213 OFFICE O/P EST LOW 20 MIN: CPT | Mod: S$PBB,,, | Performed by: RADIOLOGY

## 2019-07-17 PROCEDURE — 99999 PR PBB SHADOW E&M-EST. PATIENT-LVL III: ICD-10-PCS | Mod: PBBFAC,,,

## 2019-07-17 PROCEDURE — 36415 COLL VENOUS BLD VENIPUNCTURE: CPT

## 2019-07-17 PROCEDURE — 99999 PR PBB SHADOW E&M-EST. PATIENT-LVL III: CPT | Mod: PBBFAC,,,
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
## 2019-08-21 ENCOUNTER — OFFICE VISIT (OUTPATIENT)
Dept: INTERVENTIONAL RADIOLOGY/VASCULAR | Facility: CLINIC | Age: 64
End: 2019-08-21
Payer: MEDICAID

## 2019-08-21 ENCOUNTER — HOSPITAL ENCOUNTER (OUTPATIENT)
Dept: RADIOLOGY | Facility: HOSPITAL | Age: 64
Discharge: HOME | End: 2019-08-21
Attending: RADIOLOGY
Payer: MEDICAID

## 2019-08-21 VITALS
SYSTOLIC BLOOD PRESSURE: 123 MMHG | WEIGHT: 156.94 LBS | HEART RATE: 115 BPM | BODY MASS INDEX: 27.81 KG/M2 | DIASTOLIC BLOOD PRESSURE: 80 MMHG | HEIGHT: 63 IN

## 2019-08-21 DIAGNOSIS — C22.0 HCC (HEPATOCELLULAR CARCINOMA): Primary | ICD-10-CM

## 2019-08-21 DIAGNOSIS — D49.0 LIVER NEOPLASM: ICD-10-CM

## 2019-08-21 PROCEDURE — 99999 PR PBB SHADOW E&M-EST. PATIENT-LVL III: CPT | Mod: PBBFAC,,, | Performed by: FAMILY MEDICINE

## 2019-08-21 PROCEDURE — 99999 PR PBB SHADOW E&M-EST. PATIENT-LVL III: ICD-10-PCS | Mod: PBBFAC,,, | Performed by: FAMILY MEDICINE

## 2019-08-21 PROCEDURE — 74183 MRI ABD W/O CNTR FLWD CNTR: CPT | Mod: 26,,, | Performed by: RADIOLOGY

## 2019-08-21 PROCEDURE — 99213 OFFICE O/P EST LOW 20 MIN: CPT | Mod: PBBFAC,25 | Performed by: FAMILY MEDICINE

## 2019-08-21 PROCEDURE — 99213 PR OFFICE/OUTPT VISIT, EST, LEVL III, 20-29 MIN: ICD-10-PCS | Mod: S$PBB,,, | Performed by: FAMILY MEDICINE

## 2019-08-21 PROCEDURE — 74183 MRI ABDOMEN W WO CONTRAST: ICD-10-PCS | Mod: 26,,, | Performed by: RADIOLOGY

## 2019-08-21 PROCEDURE — 99213 OFFICE O/P EST LOW 20 MIN: CPT | Mod: S$PBB,,, | Performed by: FAMILY MEDICINE

## 2019-08-21 PROCEDURE — 74183 MRI ABD W/O CNTR FLWD CNTR: CPT | Mod: TC

## 2019-08-21 PROCEDURE — 25500020 PHARM REV CODE 255: Performed by: RADIOLOGY

## 2019-08-21 PROCEDURE — A9585 GADOBUTROL INJECTION: HCPCS | Performed by: RADIOLOGY

## 2019-08-21 RX ORDER — GADOBUTROL 604.72 MG/ML
10 INJECTION INTRAVENOUS
Status: COMPLETED | OUTPATIENT
Start: 2019-08-21 | End: 2019-08-21

## 2019-08-21 RX ADMIN — GADOBUTROL 10 ML: 604.72 INJECTION INTRAVENOUS at 10:08

## 2019-08-21 NOTE — PROGRESS NOTES
Subjective:       Patient ID: Glory Chawla is a 64 y.o. female.    Chief Complaint: Hepatocellular Carcinoma    Patient here for follow up of hepatocellular carcinoma recently treated with radioembolization on 5/6/2019. She reports feeling well. She denies any abdominal pain, but does complaint of abdominal distention. She has a history of HCV s/p treatment and SVR.    Review of Systems   Constitutional: Negative for activity change, appetite change, chills, fatigue and fever.   Respiratory: Negative for cough, shortness of breath, wheezing and stridor.    Cardiovascular: Negative for chest pain, palpitations and leg swelling.   Gastrointestinal: Positive for abdominal distention and constipation. Negative for abdominal pain, diarrhea, nausea and vomiting.       Objective:      Physical Exam   Constitutional: She is oriented to person, place, and time. She appears well-developed and well-nourished. No distress.   Cardiovascular: Normal rate, regular rhythm and normal heart sounds. Exam reveals no gallop and no friction rub.   No murmur heard.  Pulmonary/Chest: Effort normal and breath sounds normal. No stridor. No respiratory distress. She has no wheezes. She has no rales.   Abdominal: Soft. Bowel sounds are normal. She exhibits no distension and no mass. There is no tenderness. There is no guarding.   Neurological: She is alert and oriented to person, place, and time.   Skin: Skin is warm and dry. She is not diaphoretic.   Psychiatric: She has a normal mood and affect.   Vitals reviewed.      Imaging:  MRI 8/21/2019  Impression       1. Inferior right hepatic lobe ablation cavity with persistent arterial hyperenhancing nodule anteromedially that appears to demonstrate washout on portal venous and delayed phase concerning for residual tumor.  2. Radioembolization treatment site within the superior aspect of segment 8 with ill-defined areas of peripheral arterial hyperenhancement favored to represent  posttherapy changes.  No imaging findings to suggest residual tumor.  3. Interval development of subcentimeter arterial hyperenhancing foci throughout the right lobe, indeterminate.  Attention at follow-up recommended.  4. 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     Assessment:       1. HCC (hepatocellular carcinoma)        Plan:         Reviewed MRI with Dr. Puente and Dr. Overton. Explained to patient treated areas show good response, but there is residual tumor noted. Recommendation is to treat with either radioembolization or microwave ablation, or continue to monitor. Explained there is a risk of damage to gall bladder with either treatment, and a risk of growth and metastasis with monitoring. Patient would like to pursue treatment with radioembolization. Discussed how the procedure will be performed, risks (including, but not limited to, pain, bleeding, infection, damage to nearby structures, and the need for additional procedures), benefits, possible complications, pre-post procedure expectations, and alternatives. The patient voices understanding and all questions have been answered. She verbalizes familiarity with this procedure. We will call to schedule once the physician's schedule has been posted. Dr. Puente in room. Written informed consent obtained. Clinic phone number provided.

## 2019-09-06 DIAGNOSIS — C22.0 HCC (HEPATOCELLULAR CARCINOMA): Primary | ICD-10-CM

## 2019-09-17 ENCOUNTER — TELEPHONE (OUTPATIENT)
Dept: INTERVENTIONAL RADIOLOGY/VASCULAR | Facility: HOSPITAL | Age: 64
End: 2019-09-17

## 2019-09-17 DIAGNOSIS — C22.0 HCC (HEPATOCELLULAR CARCINOMA): Primary | ICD-10-CM

## 2019-11-19 ENCOUNTER — TELEPHONE (OUTPATIENT)
Dept: INTERVENTIONAL RADIOLOGY/VASCULAR | Facility: HOSPITAL | Age: 64
End: 2019-11-19

## 2019-12-05 DIAGNOSIS — C22.0 HCC (HEPATOCELLULAR CARCINOMA): Primary | ICD-10-CM

## 2019-12-12 ENCOUNTER — TELEPHONE (OUTPATIENT)
Dept: INTERVENTIONAL RADIOLOGY/VASCULAR | Facility: HOSPITAL | Age: 64
End: 2019-12-12

## 2019-12-12 DIAGNOSIS — C22.0 HCC (HEPATOCELLULAR CARCINOMA): Primary | ICD-10-CM

## 2019-12-13 ENCOUNTER — TELEPHONE (OUTPATIENT)
Dept: INTERVENTIONAL RADIOLOGY/VASCULAR | Facility: CLINIC | Age: 64
End: 2019-12-13

## 2019-12-13 NOTE — TELEPHONE ENCOUNTER
Patient called and left message on phone stating that she is cancelling her procedure due to no transporation. Dr Overton called and asked to see if we can get UBER for her and have her stay overnight.  I have called and spoke to patient and stated that she ate about 7am.  Dr Perez said that we will cancel procedure and will contact her dr for further treatment options.

## 2020-01-13 ENCOUNTER — TELEPHONE (OUTPATIENT)
Dept: HEPATOLOGY | Facility: CLINIC | Age: 65
End: 2020-01-13

## 2020-01-13 DIAGNOSIS — C22.0 HCC (HEPATOCELLULAR CARCINOMA): Primary | ICD-10-CM

## 2020-01-13 NOTE — TELEPHONE ENCOUNTER
I called patient to discuss IR appointments that patient missed. Patient stated that she would like to do MRI first to see if she needs to come back.  MRI scheduled on 1/23/20

## 2020-01-23 ENCOUNTER — TELEPHONE (OUTPATIENT)
Dept: TRANSPLANT | Facility: CLINIC | Age: 65
End: 2020-01-23

## 2020-01-23 ENCOUNTER — HOSPITAL ENCOUNTER (OUTPATIENT)
Dept: RADIOLOGY | Facility: HOSPITAL | Age: 65
Discharge: HOME | End: 2020-01-23
Attending: INTERNAL MEDICINE
Payer: MEDICAID

## 2020-01-23 DIAGNOSIS — C22.0 HCC (HEPATOCELLULAR CARCINOMA): ICD-10-CM

## 2020-01-23 PROCEDURE — 25500020 PHARM REV CODE 255: Performed by: INTERNAL MEDICINE

## 2020-01-23 PROCEDURE — 74183 MRI ABDOMEN W WO CONTRAST: ICD-10-PCS | Mod: 26,,, | Performed by: RADIOLOGY

## 2020-01-23 PROCEDURE — A9585 GADOBUTROL INJECTION: HCPCS | Performed by: INTERNAL MEDICINE

## 2020-01-23 PROCEDURE — 74183 MRI ABD W/O CNTR FLWD CNTR: CPT | Mod: 26,,, | Performed by: RADIOLOGY

## 2020-01-23 PROCEDURE — 74183 MRI ABD W/O CNTR FLWD CNTR: CPT | Mod: TC

## 2020-01-23 RX ORDER — GADOBUTROL 604.72 MG/ML
10 INJECTION INTRAVENOUS
Status: COMPLETED | OUTPATIENT
Start: 2020-01-23 | End: 2020-01-23

## 2020-01-23 RX ADMIN — GADOBUTROL 10 ML: 604.72 INJECTION INTRAVENOUS at 01:01

## 2020-01-23 NOTE — TELEPHONE ENCOUNTER
Patient: Glory Chawla       MRN: [id number]      : 1955     Age: 64 y.o.  [address]  [place] [address]    Provider: Hepatologist - Angi LUCAS - Tian    Urgency of review: urgent    Patient Transplant Status: Not a candidate - non compliance, ongoing alcohol use    Reason for presentation: Reassessment for HCC    Clinical Summary:   64F, HCV/ETOH cirrhosis, was child A, low MELD, compensated. HIV+ on ARV.   First presented to [facility] in 2018, referred from [facility]. Initial lesion was 1 cm which was TACE'ed  and MW ablated . Patient has been non-compliant with her appointments, labs and surveillance scans. She continues to drink alcohol.  19 - IR conference: residual HCC, close to GB. Patient was unable to be contacted for surgical discussion. Dr. Overton recommended y-90 but did not come back due to lack of transportation.    No recent MELD labs or AFP.  MRI now - right PV thrombus ?tumor thrombus, new 3.7 cm lesion at the juliet causing mass effect.    Imaging to be reviewed: MRI most recent    HCC Treatment History: TACE/Ablation    ABO: O POS    Platelets:   Lab Results   Component Value Date/Time     (L) 2019 09:07 AM     Creatinine:   Lab Results   Component Value Date/Time    CREATININE 1.2 2019 11:31 AM     Bilirubin:   Lab Results   Component Value Date/Time    BILITOT 0.3 2019 11:31 AM     AFP Last 3 each if available:   Lab Results   Component Value Date/Time    AFP 13 (H) 2019 11:31 AM    AFP 7.3 2019 11:18 AM    AFP 9.0 (H) 2018 05:09 PM       MELD: MELD-Na score: 8 at 2019  9:07 AM  MELD score: 8 at 2019  9:07 AM  Calculated from:  Serum Creatinine: 0.9 mg/dL (Rounded to 1 mg/dL) at 2019  9:07 AM  Serum Sodium: 142 mmol/L (Rounded to 137 mmol/L) at 2019  9:07 AM  Total Bilirubin: 0.3 mg/dL (Rounded to 1 mg/dL) at 2019  9:07 AM  INR(ratio): 1.2 at 2019  9:07 AM  Age: 63  years    Plan:     Follow-up Provider:

## 2020-01-24 LAB
CREAT SERPL-MCNC: 1.2 MG/DL (ref 0.5–1.4)
SAMPLE: NORMAL

## 2020-02-06 ENCOUNTER — TELEPHONE (OUTPATIENT)
Dept: HEPATOLOGY | Facility: CLINIC | Age: 65
End: 2020-02-06

## 2020-02-10 ENCOUNTER — TELEPHONE (OUTPATIENT)
Dept: HEPATOLOGY | Facility: CLINIC | Age: 65
End: 2020-02-10

## 2020-02-10 NOTE — TELEPHONE ENCOUNTER
MA tried to call pt to schedule her April appt, the last time I talked to her she told me she wanted a Wednesday appt around 1pm so I went a head and scheduled her appt.

## 2020-02-13 ENCOUNTER — TELEPHONE (OUTPATIENT)
Dept: HEPATOLOGY | Facility: CLINIC | Age: 65
End: 2020-02-13

## 2020-02-28 ENCOUNTER — TELEPHONE (OUTPATIENT)
Dept: HEPATOLOGY | Facility: CLINIC | Age: 65
End: 2020-02-28

## 2020-02-28 NOTE — TELEPHONE ENCOUNTER
Ma called pt about rescheduling her April appt with Dr. Whitley she would like to be put on the waiting list to be seen.

## 2020-03-05 ENCOUNTER — TELEPHONE (OUTPATIENT)
Dept: HEPATOLOGY | Facility: CLINIC | Age: 65
End: 2020-03-05

## 2020-03-05 NOTE — TELEPHONE ENCOUNTER
Ma tried calling pt to reschedule her April appt with Dr. Lindquist waiting on a call back left a .

## 2020-03-05 NOTE — TELEPHONE ENCOUNTER
----- Message from Derick Hinkle sent at 3/5/2020  9:48 AM CST -----  Contact: pt  Pt got a phone call saying that  wont be available for on her appt date with him    So pt is calling in to try and reschedule another appt with another Dr. Cause she's not feeling    Well.    Call Back: [phone]

## 2020-03-23 ENCOUNTER — TELEPHONE (OUTPATIENT)
Dept: HEPATOLOGY | Facility: CLINIC | Age: 65
End: 2020-03-23

## 2020-03-23 NOTE — TELEPHONE ENCOUNTER
MA called patient to convert clinic appointment to a VIDEO VISIT, for patient safety and less exposure to hospital settings we are now offering VIDEO VISIT. Patient unable to do Video visit. She is willing to reschedule his appt on 5/27 back with Dr. Whitley. Mailed appt reminder to patient.

## 2020-05-21 ENCOUNTER — TELEPHONE (OUTPATIENT)
Dept: HEPATOLOGY | Facility: CLINIC | Age: 65
End: 2020-05-21

## 2020-05-21 NOTE — TELEPHONE ENCOUNTER
----- Message from Lida Whitley MD sent at 5/21/2020 10:28 AM CDT -----  No  ----- Message -----  From: Gita Aviles MA  Sent: 5/21/2020   9:48 AM CDT  To: Lida Whitley MD    Pt stated she had an appointment Monday 5/25/2020 at 8:00am to see you is this correct ?

## 2020-05-27 ENCOUNTER — OFFICE VISIT (OUTPATIENT)
Dept: HEPATOLOGY | Facility: CLINIC | Age: 65
End: 2020-05-27
Payer: MEDICAID

## 2020-05-27 VITALS
SYSTOLIC BLOOD PRESSURE: 134 MMHG | DIASTOLIC BLOOD PRESSURE: 86 MMHG | OXYGEN SATURATION: 99 % | BODY MASS INDEX: 28.07 KG/M2 | RESPIRATION RATE: 18 BRPM | HEART RATE: 101 BPM | HEIGHT: 62 IN | WEIGHT: 152.56 LBS

## 2020-05-27 DIAGNOSIS — K59.00 CONSTIPATION, UNSPECIFIED CONSTIPATION TYPE: ICD-10-CM

## 2020-05-27 DIAGNOSIS — K70.31 ASCITES DUE TO ALCOHOLIC CIRRHOSIS: ICD-10-CM

## 2020-05-27 DIAGNOSIS — C22.0 HCC (HEPATOCELLULAR CARCINOMA): Primary | ICD-10-CM

## 2020-05-27 PROCEDURE — 99214 PR OFFICE/OUTPT VISIT, EST, LEVL IV, 30-39 MIN: ICD-10-PCS | Mod: S$PBB,,, | Performed by: INTERNAL MEDICINE

## 2020-05-27 PROCEDURE — 99999 PR PBB SHADOW E&M-EST. PATIENT-LVL V: ICD-10-PCS | Mod: PBBFAC,,, | Performed by: INTERNAL MEDICINE

## 2020-05-27 PROCEDURE — 99215 OFFICE O/P EST HI 40 MIN: CPT | Mod: PBBFAC | Performed by: INTERNAL MEDICINE

## 2020-05-27 PROCEDURE — 99999 PR PBB SHADOW E&M-EST. PATIENT-LVL V: CPT | Mod: PBBFAC,,, | Performed by: INTERNAL MEDICINE

## 2020-05-27 PROCEDURE — 99214 OFFICE O/P EST MOD 30 MIN: CPT | Mod: S$PBB,,, | Performed by: INTERNAL MEDICINE

## 2020-05-27 RX ORDER — SENNOSIDES 25 MG/1
TABLET, FILM COATED ORAL
COMMUNITY

## 2020-05-27 RX ORDER — SIMETHICONE 80 MG
80 TABLET,CHEWABLE ORAL EVERY 6 HOURS PRN
COMMUNITY

## 2020-05-27 RX ORDER — FUROSEMIDE 20 MG/1
20 TABLET ORAL DAILY
Qty: 30 TABLET | Refills: 11 | Status: SHIPPED | OUTPATIENT
Start: 2020-05-27 | End: 2021-05-27

## 2020-05-27 RX ORDER — ALBUTEROL SULFATE 90 UG/1
AEROSOL, METERED RESPIRATORY (INHALATION)
COMMUNITY
Start: 2020-04-16

## 2020-05-27 RX ORDER — HYDROCODONE BITARTRATE AND ACETAMINOPHEN 7.5; 325 MG/1; MG/1
1 TABLET ORAL EVERY 6 HOURS PRN
COMMUNITY

## 2020-05-27 RX ORDER — QUETIAPINE FUMARATE 100 MG/1
TABLET, FILM COATED ORAL
COMMUNITY

## 2020-05-27 RX ORDER — POLYETHYLENE GLYCOL 3350 17 G/17G
POWDER, FOR SOLUTION ORAL
COMMUNITY

## 2020-05-27 RX ORDER — DOXEPIN HYDROCHLORIDE 100 MG/1
100 CAPSULE ORAL NIGHTLY
COMMUNITY

## 2020-05-27 RX ORDER — KETOCONAZOLE 20 MG/ML
SHAMPOO, SUSPENSION TOPICAL
COMMUNITY

## 2020-05-27 RX ORDER — LORATADINE 10 MG/1
10 TABLET ORAL DAILY
COMMUNITY

## 2020-05-27 RX ORDER — MULTIVITAMIN
1 TABLET ORAL DAILY
COMMUNITY

## 2020-05-27 RX ORDER — LUBIPROSTONE 24 UG/1
24 CAPSULE ORAL 2 TIMES DAILY WITH MEALS
COMMUNITY

## 2020-05-27 RX ORDER — SPIRONOLACTONE 50 MG/1
50 TABLET, FILM COATED ORAL DAILY
Qty: 30 TABLET | Refills: 11 | Status: SHIPPED | OUTPATIENT
Start: 2020-05-27 | End: 2021-05-27

## 2020-05-27 RX ORDER — CAPSAICIN 0.03 G/100G
CREAM TOPICAL 2 TIMES DAILY
COMMUNITY

## 2020-05-27 RX ORDER — POLYETHYLENE GLYCOL 3350 17 G/17G
17 POWDER, FOR SOLUTION ORAL DAILY
Qty: 10 PACKET | Refills: 5 | Status: SHIPPED | OUTPATIENT
Start: 2020-05-27

## 2020-05-27 RX ORDER — CYCLOBENZAPRINE HCL 5 MG
5 TABLET ORAL 3 TIMES DAILY PRN
COMMUNITY

## 2020-05-27 NOTE — LETTER
May 27, 2020        Naveed Benitez MD  [address] [facility] [place] [address]             [facility]at[facility]  [address]  [facility] [address]  Phone: [phone]  Fax: [phone]   Patient: Glory Chawla   MR Number: [id number]   YOB: 1955   Date of Visit: 5/27/2020       Dear Dr. Benitez:    Thank you for referring Glory Chawla to me for evaluation. Attached you will find relevant portions of my assessment and plan of care.    If you have questions, please do not hesitate to call me. I look forward to following Glory Chawla along with you.    Sincerely,      Lida Whitley MD            CC  No Recipients    Enclosure
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
## 2020-06-05 ENCOUNTER — TELEPHONE (OUTPATIENT)
Dept: HEPATOLOGY | Facility: CLINIC | Age: 65
End: 2020-06-05

## 2020-06-05 NOTE — TELEPHONE ENCOUNTER
----- Message from Cesar Cristobal sent at 6/5/2020 12:13 PM CDT -----  Contact: Pt  Pt doesn't have a ride and would like to reschedule today's appointment      [phone]

## 2020-06-12 ENCOUNTER — TELEPHONE (OUTPATIENT)
Dept: HEPATOLOGY | Facility: CLINIC | Age: 65
End: 2020-06-12

## 2020-06-12 ENCOUNTER — HOSPITAL ENCOUNTER (OUTPATIENT)
Dept: RADIOLOGY | Facility: HOSPITAL | Age: 65
Discharge: HOME | End: 2020-06-12
Attending: INTERNAL MEDICINE
Payer: MEDICARE

## 2020-06-12 DIAGNOSIS — C22.0 HCC (HEPATOCELLULAR CARCINOMA): ICD-10-CM

## 2020-06-12 PROCEDURE — 74183 MRI ABDOMEN W WO CONTRAST: ICD-10-PCS | Mod: 26,GC,, | Performed by: RADIOLOGY

## 2020-06-12 PROCEDURE — A9585 GADOBUTROL INJECTION: HCPCS | Performed by: INTERNAL MEDICINE

## 2020-06-12 PROCEDURE — 74183 MRI ABD W/O CNTR FLWD CNTR: CPT | Mod: TC

## 2020-06-12 PROCEDURE — 25500020 PHARM REV CODE 255: Performed by: INTERNAL MEDICINE

## 2020-06-12 PROCEDURE — 74183 MRI ABD W/O CNTR FLWD CNTR: CPT | Mod: 26,GC,, | Performed by: RADIOLOGY

## 2020-06-12 RX ORDER — GADOBUTROL 604.72 MG/ML
10 INJECTION INTRAVENOUS
Status: COMPLETED | OUTPATIENT
Start: 2020-06-12 | End: 2020-06-12

## 2020-06-12 RX ADMIN — GADOBUTROL 10 ML: 604.72 INJECTION INTRAVENOUS at 09:06

## 2020-06-12 NOTE — TELEPHONE ENCOUNTER
Patient: Glory Chawla       MRN: [id number]      : 1955     Age: 65 y.o.  [address] [id number]    Provider: Hepatologist - Angi Overton    Urgency of review: urgent    Patient Transplant Status: Not a candidate- non-compliance    Reason for presentation: Reassessment of HCC    Clinical Summary:   64F, HCV/ETOH cirrhosis, was child A, low MELD, compensated. HIV+ on ARV.   First presented to [facility] in 2018, referred from [facility]. Initial lesion was 1 cm which was TACE'ed  and MW ablated . Patient has been non-compliant with her appointments, labs and surveillance scans. She continues to drink alcohol.  19 - IR conference: residual HCC, close to GB. Patient was unable to be contacted for surgical discussion. Dr. Overton recommended y-90 but did not come back due to lack of transportation.     20: MRI  - right PV thrombus ?tumor thrombus, new 3.7 cm lesion at the juliet causing mass effect.   IR - TACE was recommended.  Patient was unable to be contacted, letter sent.    Patient contacted for follow up in May. Now with some ascites and decompensation. MELD: 10.    MRI repeated 1220:     AFP 69 ( was 13 in 2020)    Imaging to be reviewed: most recent MRI    HCC Treatment History: TACE/Ablation    ABO: O POS    Platelets:   Lab Results   Component Value Date/Time     (L) 2020 10:28 AM     Creatinine:   Lab Results   Component Value Date/Time    CREATININE 1.0 2020 10:28 AM     Bilirubin:   Lab Results   Component Value Date/Time    BILITOT 1.2 (H) 2020 10:28 AM     AFP Last 3 each if available:   Lab Results   Component Value Date/Time    AFP 69 (H) 2020 10:28 AM    AFP 13 (H) 2019 11:31 AM    AFP 7.3 2019 11:18 AM       MELD: MELD-Na score: 10 at 2020 10:28 AM  MELD score: 10 at 2020 10:28 AM  Calculated from:  Serum Creatinine: 1.0 mg/dL at 2020 10:28 AM  Serum Sodium:  137 mmol/L at 6/12/2020 10:28 AM  Total Bilirubin: 1.2 mg/dL at 6/12/2020 10:28 AM  INR(ratio): 1.3 at 6/12/2020 10:28 AM  Age: 65 years    Plan:     Follow-up Provider:

## 2020-06-15 LAB
CREAT SERPL-MCNC: 0.9 MG/DL (ref 0.5–1.4)
SAMPLE: NORMAL

## 2020-06-17 ENCOUNTER — TELEPHONE (OUTPATIENT)
Dept: HEPATOLOGY | Facility: CLINIC | Age: 65
End: 2020-06-17

## 2020-06-17 DIAGNOSIS — C22.0 HCC (HEPATOCELLULAR CARCINOMA): Primary | ICD-10-CM

## 2020-06-17 NOTE — TELEPHONE ENCOUNTER
"Informed patient that her HCC has progressed and she likely has a metastatic 1 cm lesion in her lungs. The multidisciplinary committee recommends Oncology appointment for systemic therapy.     Pt states that she is having constipation and wants to increase the dose of "Linzess". I did not prescribe her Linzess but she has an impression that I prescribed her Linzess. I recommended her to discuss with her primary care or GI physician.  "

## 2020-06-18 ENCOUNTER — TELEPHONE (OUTPATIENT)
Dept: HEMATOLOGY/ONCOLOGY | Facility: CLINIC | Age: 65
End: 2020-06-18

## 2020-06-19 ENCOUNTER — TELEPHONE (OUTPATIENT)
Dept: PHARMACY | Facility: CLINIC | Age: 65
End: 2020-06-19

## 2020-06-19 ENCOUNTER — OFFICE VISIT (OUTPATIENT)
Dept: HEMATOLOGY/ONCOLOGY | Facility: CLINIC | Age: 65
End: 2020-06-19
Payer: MEDICARE

## 2020-06-19 VITALS
HEIGHT: 63 IN | RESPIRATION RATE: 20 BRPM | HEART RATE: 94 BPM | DIASTOLIC BLOOD PRESSURE: 78 MMHG | BODY MASS INDEX: 26.13 KG/M2 | TEMPERATURE: 98 F | SYSTOLIC BLOOD PRESSURE: 118 MMHG | WEIGHT: 147.5 LBS

## 2020-06-19 DIAGNOSIS — C22.0 HCC (HEPATOCELLULAR CARCINOMA): Primary | ICD-10-CM

## 2020-06-19 DIAGNOSIS — R18.8 OTHER ASCITES: ICD-10-CM

## 2020-06-19 PROCEDURE — 99999 PR PBB SHADOW E&M-EST. PATIENT-LVL V: CPT | Mod: PBBFAC,,,

## 2020-06-19 PROCEDURE — 99205 OFFICE O/P NEW HI 60 MIN: CPT | Mod: S$PBB,,,

## 2020-06-19 PROCEDURE — 99999 PR PBB SHADOW E&M-EST. PATIENT-LVL V: ICD-10-PCS | Mod: PBBFAC,,,

## 2020-06-19 PROCEDURE — 99215 OFFICE O/P EST HI 40 MIN: CPT | Mod: PBBFAC

## 2020-06-19 PROCEDURE — 99205 PR OFFICE/OUTPT VISIT, NEW, LEVL V, 60-74 MIN: ICD-10-PCS | Mod: S$PBB,,,

## 2020-06-19 RX ORDER — METOPROLOL SUCCINATE 25 MG/1
25 TABLET, EXTENDED RELEASE ORAL
COMMUNITY
Start: 2014-10-31

## 2020-06-19 NOTE — Clinical Note
CT C/A/P on Monday   IR appointment for paracentesis, she will need twice a week appointments   FU with me in 2 weeks with CBC, CMP and urinalysis before visit

## 2020-06-19 NOTE — TELEPHONE ENCOUNTER
Informed Patient  that [facility] received prescription for Lenvima and benefits investigation is required.  [facility] will be back in touch once insurance determination is received.
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
## 2020-06-22 NOTE — TELEPHONE ENCOUNTER
DOCUMENTATION ONLY:  Lenvima 12 mg/day (4 mg x 3) Capsule #90/30 does not require a prior authorization through the patient's insurance.    Co-pay: $0.00     Patient Assistance IS NOT required. Sending to clinical pharmacist for  and shipment. Wayne BENOIT

## 2020-06-25 ENCOUNTER — HOSPITAL ENCOUNTER (OUTPATIENT)
Dept: INTERVENTIONAL RADIOLOGY/VASCULAR | Facility: HOSPITAL | Age: 65
Discharge: HOME | End: 2020-06-25
Payer: MEDICARE

## 2020-06-25 VITALS
RESPIRATION RATE: 16 BRPM | HEART RATE: 94 BPM | DIASTOLIC BLOOD PRESSURE: 88 MMHG | SYSTOLIC BLOOD PRESSURE: 139 MMHG | OXYGEN SATURATION: 100 %

## 2020-06-25 DIAGNOSIS — R18.8 OTHER ASCITES: ICD-10-CM

## 2020-06-25 DIAGNOSIS — C22.0 HCC (HEPATOCELLULAR CARCINOMA): ICD-10-CM

## 2020-06-25 LAB
ALBUMIN FLD-MCNC: 0.9 G/DL
APPEARANCE FLD: CLEAR
BODY FLD TYPE: NORMAL
BODY FLUID SOURCE, LDH: NORMAL
COLOR FLD: YELLOW
GRAM STN SPEC: NORMAL
GRAM STN SPEC: NORMAL
LDH FLD L TO P-CCNC: 39 U/L
LYMPHOCYTES NFR FLD MANUAL: 81 %
MESOTHL CELL NFR FLD MANUAL: 4 %
MONOS+MACROS NFR FLD MANUAL: 9 %
NEUTROPHILS NFR FLD MANUAL: 6 %
PROT FLD-MCNC: 2.1 G/DL
SPECIMEN SOURCE: NORMAL
SPECIMEN SOURCE: NORMAL
WBC # FLD: 57 /CU MM

## 2020-06-25 PROCEDURE — 88305 TISSUE EXAM BY PATHOLOGIST: ICD-10-PCS | Mod: 26,,, | Performed by: PATHOLOGY

## 2020-06-25 PROCEDURE — 89051 BODY FLUID CELL COUNT: CPT

## 2020-06-25 PROCEDURE — 88112 CYTOPATH CELL ENHANCE TECH: CPT | Mod: 26,,, | Performed by: PATHOLOGY

## 2020-06-25 PROCEDURE — 82042 OTHER SOURCE ALBUMIN QUAN EA: CPT

## 2020-06-25 PROCEDURE — 87205 SMEAR GRAM STAIN: CPT

## 2020-06-25 PROCEDURE — 49083 IR PARACENTESIS WITH IMAGING: ICD-10-PCS | Mod: ,,, | Performed by: RADIOLOGY

## 2020-06-25 PROCEDURE — 88112 PR  CYTOPATH, CELL ENHANCE TECH: ICD-10-PCS | Mod: 26,,, | Performed by: PATHOLOGY

## 2020-06-25 PROCEDURE — 88112 CYTOPATH CELL ENHANCE TECH: CPT | Performed by: PATHOLOGY

## 2020-06-25 PROCEDURE — 88342 CHG IMMUNOCYTOCHEMISTRY: ICD-10-PCS | Mod: 26,,, | Performed by: PATHOLOGY

## 2020-06-25 PROCEDURE — 88341 IMHCHEM/IMCYTCHM EA ADD ANTB: CPT | Mod: 26,,, | Performed by: PATHOLOGY

## 2020-06-25 PROCEDURE — 88341 IMHCHEM/IMCYTCHM EA ADD ANTB: CPT | Mod: 59 | Performed by: PATHOLOGY

## 2020-06-25 PROCEDURE — 88341 PR IHC OR ICC EACH ADD'L SINGLE ANTIBODY  STAINPR: ICD-10-PCS | Mod: 26,,, | Performed by: PATHOLOGY

## 2020-06-25 PROCEDURE — 49083 ABD PARACENTESIS W/IMAGING: CPT | Mod: ,,, | Performed by: RADIOLOGY

## 2020-06-25 PROCEDURE — 88305 TISSUE EXAM BY PATHOLOGIST: CPT | Mod: 26,,, | Performed by: PATHOLOGY

## 2020-06-25 PROCEDURE — 88342 IMHCHEM/IMCYTCHM 1ST ANTB: CPT | Mod: 26,,, | Performed by: PATHOLOGY

## 2020-06-25 PROCEDURE — 88342 IMHCHEM/IMCYTCHM 1ST ANTB: CPT | Performed by: PATHOLOGY

## 2020-06-25 PROCEDURE — 87075 CULTR BACTERIA EXCEPT BLOOD: CPT

## 2020-06-25 PROCEDURE — 83615 LACTATE (LD) (LDH) ENZYME: CPT

## 2020-06-25 PROCEDURE — 49083 ABD PARACENTESIS W/IMAGING: CPT

## 2020-06-25 PROCEDURE — 84157 ASSAY OF PROTEIN OTHER: CPT

## 2020-06-25 PROCEDURE — 87070 CULTURE OTHR SPECIMN AEROBIC: CPT

## 2020-06-25 PROCEDURE — 88305 TISSUE EXAM BY PATHOLOGIST: CPT | Performed by: PATHOLOGY

## 2020-06-25 NOTE — DISCHARGE INSTRUCTIONS
For scheduling: Call Kaur at [phone]    For questions or concerns call: MATT MON-FRI 8 AM- 5PM [phone]. Radiology resident on call [phone].    For immediate concerns that are not emergent, you may call our radiology clinic at: [phone]

## 2020-06-25 NOTE — PROGRESS NOTES
Paracentesis complete. Pt tolerated well. 2500cc removed from right abdomen. Dressing applied clean, dry and intact. Patient given discharge paperwork and verbalized understanding of at home care. Patient discharged to lobby via wheelchair with family awaiting.

## 2020-06-25 NOTE — PROCEDURES
Radiology Post-Procedure Note    Pre Op Diagnosis: Ascites  Post Op Diagnosis: Same    Procedure: Paracentesis    Procedure performed by: Linda Ureña MD    Written Informed Consent Obtained: Yes  Specimen Removed: YES   Estimated Blood Loss: Minimal    Findings:   Successful paracentesis.      Patient tolerated procedure well.    Linda Ureña, PGY-3
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
## 2020-06-25 NOTE — DISCHARGE SUMMARY
Radiology Discharge Summary      Hospital Course: No complications    Admit Date: 6/25/2020  Discharge Date: 06/25/2020     Instructions Given to Patient: Yes  Diet: Resume prior diet  Activity: activity as tolerated    Description of Condition on Discharge: Stable  Vital Signs (Most Recent): Pulse: 99 (06/25/20 0802)  Resp: 16 (06/25/20 0802)  BP: 116/80 (06/25/20 0802)  SpO2: 99 % (06/25/20 0802)    Discharge Disposition: Home    Discharge Diagnosis: status post paracentesis      Follow-up: with referring provider     Linda Ureña, PGY-3

## 2020-06-26 NOTE — TELEPHONE ENCOUNTER
Call Attempt 1: LVM for Lenvima initial consult. Medicaid insurance is now saying coverage is terminated. Patient needs to contact Medicaid to discuss.

## 2020-06-29 ENCOUNTER — HOSPITAL ENCOUNTER (OUTPATIENT)
Dept: CARDIOLOGY | Facility: CLINIC | Age: 65
Discharge: HOME | End: 2020-06-29
Payer: MEDICAID

## 2020-06-29 DIAGNOSIS — C22.0 HCC (HEPATOCELLULAR CARCINOMA): ICD-10-CM

## 2020-06-29 LAB
BACTERIA SPEC AEROBE CULT: NO GROWTH
FINAL PATHOLOGIC DIAGNOSIS: ABNORMAL
MICROSCOPIC EXAM: ABNORMAL

## 2020-06-29 PROCEDURE — 93010 ELECTROCARDIOGRAM REPORT: CPT | Mod: S$PBB,,, | Performed by: INTERNAL MEDICINE

## 2020-06-29 PROCEDURE — 93005 ELECTROCARDIOGRAM TRACING: CPT | Mod: PBBFAC | Performed by: INTERNAL MEDICINE

## 2020-06-29 PROCEDURE — 93010 EKG 12-LEAD: ICD-10-PCS | Mod: S$PBB,,, | Performed by: INTERNAL MEDICINE

## 2020-06-29 NOTE — TELEPHONE ENCOUNTER
Call Attempt 2: LVM for Lenvima initial consult. Only one phone number on file. Patient portal declined. Insurance is now rejecting through insurance. Medicaid insurance is now saying coverage is terminated. Patient needs to contact Medicaid to discuss.

## 2020-07-01 NOTE — TELEPHONE ENCOUNTER
Call Attempt 3: LVM for Lenvima initial consult. Only one phone number on file. Patient portal declined. Insurance is now rejecting. Medicaid insurance is now saying coverage is terminated. Patient needs to contact Medicaid to discuss.     Gege Vickers, Pharm.D.  Pharmacy Resident, PGY-1   [facility]  [phone]

## 2020-07-02 ENCOUNTER — DOCUMENTATION ONLY (OUTPATIENT)
Dept: HEMATOLOGY/ONCOLOGY | Facility: CLINIC | Age: 65
End: 2020-07-02

## 2020-07-02 ENCOUNTER — OFFICE VISIT (OUTPATIENT)
Dept: HEMATOLOGY/ONCOLOGY | Facility: CLINIC | Age: 65
End: 2020-07-02
Payer: MEDICARE

## 2020-07-02 ENCOUNTER — LAB VISIT (OUTPATIENT)
Dept: LAB | Facility: HOSPITAL | Age: 65
End: 2020-07-02
Payer: MEDICARE

## 2020-07-02 VITALS
RESPIRATION RATE: 18 BRPM | HEART RATE: 99 BPM | DIASTOLIC BLOOD PRESSURE: 71 MMHG | HEIGHT: 63 IN | SYSTOLIC BLOOD PRESSURE: 111 MMHG | WEIGHT: 142.88 LBS | BODY MASS INDEX: 25.32 KG/M2 | TEMPERATURE: 98 F | OXYGEN SATURATION: 99 %

## 2020-07-02 DIAGNOSIS — R18.8 OTHER ASCITES: ICD-10-CM

## 2020-07-02 DIAGNOSIS — C22.0 HCC (HEPATOCELLULAR CARCINOMA): Primary | ICD-10-CM

## 2020-07-02 DIAGNOSIS — C22.0 HCC (HEPATOCELLULAR CARCINOMA): ICD-10-CM

## 2020-07-02 LAB
ALBUMIN SERPL BCP-MCNC: 2.7 G/DL (ref 3.5–5.2)
ALP SERPL-CCNC: 374 U/L (ref 55–135)
ALT SERPL W/O P-5'-P-CCNC: 55 U/L (ref 10–44)
ANION GAP SERPL CALC-SCNC: 9 MMOL/L (ref 8–16)
AST SERPL-CCNC: 104 U/L (ref 10–40)
BACTERIA SPEC ANAEROBE CULT: NORMAL
BASOPHILS # BLD AUTO: 0.03 K/UL (ref 0–0.2)
BASOPHILS NFR BLD: 0.6 % (ref 0–1.9)
BILIRUB SERPL-MCNC: 1.6 MG/DL (ref 0.1–1)
BUN SERPL-MCNC: 7 MG/DL (ref 8–23)
CALCIUM SERPL-MCNC: 10.6 MG/DL (ref 8.7–10.5)
CHLORIDE SERPL-SCNC: 105 MMOL/L (ref 95–110)
CO2 SERPL-SCNC: 21 MMOL/L (ref 23–29)
CREAT SERPL-MCNC: 1 MG/DL (ref 0.5–1.4)
DIFFERENTIAL METHOD: ABNORMAL
EOSINOPHIL # BLD AUTO: 0.1 K/UL (ref 0–0.5)
EOSINOPHIL NFR BLD: 2.6 % (ref 0–8)
ERYTHROCYTE [DISTWIDTH] IN BLOOD BY AUTOMATED COUNT: 15.4 % (ref 11.5–14.5)
EST. GFR  (AFRICAN AMERICAN): >60 ML/MIN/1.73 M^2
EST. GFR  (NON AFRICAN AMERICAN): 59.3 ML/MIN/1.73 M^2
GLUCOSE SERPL-MCNC: 119 MG/DL (ref 70–110)
HCT VFR BLD AUTO: 33.7 % (ref 37–48.5)
HGB BLD-MCNC: 11.6 G/DL (ref 12–16)
IMM GRANULOCYTES # BLD AUTO: 0.01 K/UL (ref 0–0.04)
IMM GRANULOCYTES NFR BLD AUTO: 0.2 % (ref 0–0.5)
LYMPHOCYTES # BLD AUTO: 1.1 K/UL (ref 1–4.8)
LYMPHOCYTES NFR BLD: 22.8 % (ref 18–48)
MCH RBC QN AUTO: 34.6 PG (ref 27–31)
MCHC RBC AUTO-ENTMCNC: 34.4 G/DL (ref 32–36)
MCV RBC AUTO: 101 FL (ref 82–98)
MONOCYTES # BLD AUTO: 0.5 K/UL (ref 0.3–1)
MONOCYTES NFR BLD: 9.4 % (ref 4–15)
NEUTROPHILS # BLD AUTO: 3.2 K/UL (ref 1.8–7.7)
NEUTROPHILS NFR BLD: 64.4 % (ref 38–73)
NRBC BLD-RTO: 0 /100 WBC
PLATELET # BLD AUTO: 135 K/UL (ref 150–350)
PMV BLD AUTO: 11.2 FL (ref 9.2–12.9)
POTASSIUM SERPL-SCNC: 4 MMOL/L (ref 3.5–5.1)
PROT SERPL-MCNC: 7.9 G/DL (ref 6–8.4)
RBC # BLD AUTO: 3.35 M/UL (ref 4–5.4)
SODIUM SERPL-SCNC: 135 MMOL/L (ref 136–145)
WBC # BLD AUTO: 5 K/UL (ref 3.9–12.7)

## 2020-07-02 PROCEDURE — 99999 PR PBB SHADOW E&M-EST. PATIENT-LVL V: CPT | Mod: PBBFAC,GC,,

## 2020-07-02 PROCEDURE — 99214 OFFICE O/P EST MOD 30 MIN: CPT | Mod: S$PBB,GC,,

## 2020-07-02 PROCEDURE — 99215 OFFICE O/P EST HI 40 MIN: CPT | Mod: PBBFAC

## 2020-07-02 PROCEDURE — 99214 PR OFFICE/OUTPT VISIT, EST, LEVL IV, 30-39 MIN: ICD-10-PCS | Mod: S$PBB,GC,,

## 2020-07-02 PROCEDURE — 36415 COLL VENOUS BLD VENIPUNCTURE: CPT

## 2020-07-02 PROCEDURE — 80053 COMPREHEN METABOLIC PANEL: CPT

## 2020-07-02 PROCEDURE — 99999 PR PBB SHADOW E&M-EST. PATIENT-LVL V: ICD-10-PCS | Mod: PBBFAC,GC,,

## 2020-07-02 PROCEDURE — 85025 COMPLETE CBC W/AUTO DIFF WBC: CPT

## 2020-07-02 RX ORDER — CYPROHEPTADINE HYDROCHLORIDE 4 MG/1
4 TABLET ORAL 3 TIMES DAILY PRN
Qty: 90 TABLET | Refills: 2 | Status: SHIPPED | OUTPATIENT
Start: 2020-07-02

## 2020-07-02 NOTE — PROGRESS NOTES
In response to in basket message from Dr. Petty, JOY called pt and provided her with contact information for Paola Kuldip, [phone] at the [facility] office as pt needs help applying for Medicare.  JOY provided name and contact information and encouraged pt to call with any future needs.

## 2020-07-02 NOTE — PLAN OF CARE
START OFF PATHWAY REGIMEN - Other            Nivolumab (Opdivo)           Additional Orders: Ref: Opdivo (nivolumab) [package insert].   [place]: [employer], [place]; 2018.    **Always confirm dose/schedule in your pharmacy ordering system**    Patient Characteristics:  Intent of Therapy:  Non-Curative / Palliative Intent, Discussed with Patient
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
## 2020-07-02 NOTE — Clinical Note
FU with me in 2 weeks with CBC, CMP before visit, and chemo chair for Opdivo after if we have insurance auth  Needs appointment with IR sometime next week for therapeutic paracentesis (I entered a standing order  Rachana I referred the patient to social work, she is eligible for medicare but has not applied yet and we are having trouble with medicaid authorizing her treatment

## 2020-07-07 ENCOUNTER — HOSPITAL ENCOUNTER (OUTPATIENT)
Dept: RADIOLOGY | Facility: HOSPITAL | Age: 65
Discharge: HOME | End: 2020-07-07
Payer: MEDICARE

## 2020-07-07 DIAGNOSIS — C22.0 HCC (HEPATOCELLULAR CARCINOMA): ICD-10-CM

## 2020-07-07 PROCEDURE — 71260 CT THORAX DX C+: CPT | Mod: 26,,, | Performed by: RADIOLOGY

## 2020-07-07 PROCEDURE — 74177 CT CHEST ABDOMEN PELVIS WITH CONTRAST (XPD): ICD-10-PCS | Mod: 26,,, | Performed by: RADIOLOGY

## 2020-07-07 PROCEDURE — 25500020 PHARM REV CODE 255

## 2020-07-07 PROCEDURE — 74177 CT ABD & PELVIS W/CONTRAST: CPT | Mod: TC

## 2020-07-07 PROCEDURE — 71260 CT CHEST ABDOMEN PELVIS WITH CONTRAST (XPD): ICD-10-PCS | Mod: 26,,, | Performed by: RADIOLOGY

## 2020-07-07 PROCEDURE — 74177 CT ABD & PELVIS W/CONTRAST: CPT | Mod: 26,,, | Performed by: RADIOLOGY

## 2020-07-07 RX ADMIN — IOHEXOL 100 ML: 350 INJECTION, SOLUTION INTRAVENOUS at 09:07

## 2020-07-07 RX ADMIN — IOHEXOL 15 ML: 300 INJECTION, SOLUTION INTRAVENOUS at 09:07
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
## 2020-07-16 ENCOUNTER — LAB VISIT (OUTPATIENT)
Dept: LAB | Facility: HOSPITAL | Age: 65
End: 2020-07-16
Payer: MEDICARE

## 2020-07-16 ENCOUNTER — OFFICE VISIT (OUTPATIENT)
Dept: HEMATOLOGY/ONCOLOGY | Facility: CLINIC | Age: 65
End: 2020-07-16
Payer: MEDICARE

## 2020-07-16 VITALS
OXYGEN SATURATION: 99 % | DIASTOLIC BLOOD PRESSURE: 75 MMHG | HEIGHT: 63 IN | WEIGHT: 145.94 LBS | RESPIRATION RATE: 16 BRPM | SYSTOLIC BLOOD PRESSURE: 115 MMHG | HEART RATE: 93 BPM | BODY MASS INDEX: 25.86 KG/M2 | TEMPERATURE: 98 F

## 2020-07-16 DIAGNOSIS — K59.01 SLOW TRANSIT CONSTIPATION: ICD-10-CM

## 2020-07-16 DIAGNOSIS — C22.0 HCC (HEPATOCELLULAR CARCINOMA): Primary | ICD-10-CM

## 2020-07-16 DIAGNOSIS — E89.0 POSTPROCEDURAL HYPOTHYROIDISM: ICD-10-CM

## 2020-07-16 DIAGNOSIS — C22.0 HCC (HEPATOCELLULAR CARCINOMA): ICD-10-CM

## 2020-07-16 LAB
ALBUMIN SERPL BCP-MCNC: 2.8 G/DL (ref 3.5–5.2)
ALP SERPL-CCNC: 426 U/L (ref 55–135)
ALT SERPL W/O P-5'-P-CCNC: 52 U/L (ref 10–44)
ANION GAP SERPL CALC-SCNC: 7 MMOL/L (ref 8–16)
AST SERPL-CCNC: 131 U/L (ref 10–40)
BASOPHILS # BLD AUTO: 0.03 K/UL (ref 0–0.2)
BASOPHILS NFR BLD: 0.7 % (ref 0–1.9)
BILIRUB SERPL-MCNC: 2 MG/DL (ref 0.1–1)
BUN SERPL-MCNC: 10 MG/DL (ref 8–23)
CALCIUM SERPL-MCNC: 10.1 MG/DL (ref 8.7–10.5)
CHLORIDE SERPL-SCNC: 107 MMOL/L (ref 95–110)
CO2 SERPL-SCNC: 21 MMOL/L (ref 23–29)
CREAT SERPL-MCNC: 1.1 MG/DL (ref 0.5–1.4)
DIFFERENTIAL METHOD: ABNORMAL
EOSINOPHIL # BLD AUTO: 0.1 K/UL (ref 0–0.5)
EOSINOPHIL NFR BLD: 1.8 % (ref 0–8)
ERYTHROCYTE [DISTWIDTH] IN BLOOD BY AUTOMATED COUNT: 17.2 % (ref 11.5–14.5)
EST. GFR  (AFRICAN AMERICAN): >60 ML/MIN/1.73 M^2
EST. GFR  (NON AFRICAN AMERICAN): 52.8 ML/MIN/1.73 M^2
GLUCOSE SERPL-MCNC: 90 MG/DL (ref 70–110)
HCT VFR BLD AUTO: 32.1 % (ref 37–48.5)
HGB BLD-MCNC: 10.8 G/DL (ref 12–16)
IMM GRANULOCYTES # BLD AUTO: 0.01 K/UL (ref 0–0.04)
IMM GRANULOCYTES NFR BLD AUTO: 0.2 % (ref 0–0.5)
LYMPHOCYTES # BLD AUTO: 1 K/UL (ref 1–4.8)
LYMPHOCYTES NFR BLD: 21.5 % (ref 18–48)
MCH RBC QN AUTO: 33.4 PG (ref 27–31)
MCHC RBC AUTO-ENTMCNC: 33.6 G/DL (ref 32–36)
MCV RBC AUTO: 99 FL (ref 82–98)
MONOCYTES # BLD AUTO: 0.4 K/UL (ref 0.3–1)
MONOCYTES NFR BLD: 9.3 % (ref 4–15)
NEUTROPHILS # BLD AUTO: 2.9 K/UL (ref 1.8–7.7)
NEUTROPHILS NFR BLD: 66.5 % (ref 38–73)
NRBC BLD-RTO: 0 /100 WBC
PLATELET # BLD AUTO: 147 K/UL (ref 150–350)
PMV BLD AUTO: 11.1 FL (ref 9.2–12.9)
POTASSIUM SERPL-SCNC: 4.2 MMOL/L (ref 3.5–5.1)
PROT SERPL-MCNC: 7.8 G/DL (ref 6–8.4)
RBC # BLD AUTO: 3.23 M/UL (ref 4–5.4)
SODIUM SERPL-SCNC: 135 MMOL/L (ref 136–145)
TSH SERPL DL<=0.005 MIU/L-ACNC: 3.06 UIU/ML (ref 0.4–4)
WBC # BLD AUTO: 4.42 K/UL (ref 3.9–12.7)

## 2020-07-16 PROCEDURE — 99999 PR PBB SHADOW E&M-EST. PATIENT-LVL V: ICD-10-PCS | Mod: PBBFAC,GC,,

## 2020-07-16 PROCEDURE — 85025 COMPLETE CBC W/AUTO DIFF WBC: CPT

## 2020-07-16 PROCEDURE — 84443 ASSAY THYROID STIM HORMONE: CPT

## 2020-07-16 PROCEDURE — 36415 COLL VENOUS BLD VENIPUNCTURE: CPT

## 2020-07-16 PROCEDURE — 99214 OFFICE O/P EST MOD 30 MIN: CPT | Mod: S$PBB,GC,,

## 2020-07-16 PROCEDURE — 99214 PR OFFICE/OUTPT VISIT, EST, LEVL IV, 30-39 MIN: ICD-10-PCS | Mod: S$PBB,GC,,

## 2020-07-16 PROCEDURE — 99215 OFFICE O/P EST HI 40 MIN: CPT | Mod: PBBFAC

## 2020-07-16 PROCEDURE — 80053 COMPREHEN METABOLIC PANEL: CPT

## 2020-07-16 PROCEDURE — 99999 PR PBB SHADOW E&M-EST. PATIENT-LVL V: CPT | Mod: PBBFAC,GC,,

## 2020-07-16 RX ORDER — SODIUM CHLORIDE 0.9 % (FLUSH) 0.9 %
10 SYRINGE (ML) INJECTION
Status: CANCELLED | OUTPATIENT
Start: 2020-07-17

## 2020-07-16 RX ORDER — HEPARIN 100 UNIT/ML
500 SYRINGE INTRAVENOUS
Status: CANCELLED | OUTPATIENT
Start: 2020-07-17

## 2020-07-16 RX ORDER — SENNOSIDES 8.6 MG/1
1 TABLET ORAL NIGHTLY
Qty: 60 TABLET | Refills: 1 | Status: SHIPPED | OUTPATIENT
Start: 2020-07-16

## 2020-07-17 ENCOUNTER — INFUSION (OUTPATIENT)
Dept: INFUSION THERAPY | Facility: HOSPITAL | Age: 65
End: 2020-07-17
Attending: INTERNAL MEDICINE
Payer: MEDICARE

## 2020-07-17 ENCOUNTER — CLINICAL SUPPORT (OUTPATIENT)
Dept: HEMATOLOGY/ONCOLOGY | Facility: CLINIC | Age: 65
End: 2020-07-17
Payer: MEDICARE

## 2020-07-17 VITALS
DIASTOLIC BLOOD PRESSURE: 66 MMHG | TEMPERATURE: 98 F | HEART RATE: 81 BPM | SYSTOLIC BLOOD PRESSURE: 117 MMHG | RESPIRATION RATE: 18 BRPM

## 2020-07-17 DIAGNOSIS — C22.0 HCC (HEPATOCELLULAR CARCINOMA): Primary | ICD-10-CM

## 2020-07-17 DIAGNOSIS — Z13.9 ENCOUNTER FOR SCREENING: Primary | ICD-10-CM

## 2020-07-17 LAB — SARS-COV-2 RDRP RESP QL NAA+PROBE: NEGATIVE

## 2020-07-17 PROCEDURE — 25000003 PHARM REV CODE 250: Performed by: INTERNAL MEDICINE

## 2020-07-17 PROCEDURE — 96413 CHEMO IV INFUSION 1 HR: CPT

## 2020-07-17 PROCEDURE — U0002 COVID-19 LAB TEST NON-CDC: HCPCS

## 2020-07-17 PROCEDURE — 63600175 PHARM REV CODE 636 W HCPCS: Mod: JG | Performed by: INTERNAL MEDICINE

## 2020-07-17 RX ADMIN — SODIUM CHLORIDE 480 MG: 9 INJECTION, SOLUTION INTRAVENOUS at 11:07

## 2020-07-17 RX ADMIN — SODIUM CHLORIDE: 9 INJECTION, SOLUTION INTRAVENOUS at 11:07

## 2020-07-17 NOTE — PLAN OF CARE
Pt educated on treatment plan. Pt tolerated Opdivo with no complications. VSS. Pt instructed to call MD with any problems. NAD. Pt discharged home via wheelchair.

## 2020-07-17 NOTE — PROGRESS NOTES
COVID Screening specimen collected.    Negative for the following symptoms:  Fever       Cough  Shortness of breath  YES  Difficulty breathing   YES  GI symptoms such as diarrhea or nausea    Loss of taste      Loss of smell

## 2020-07-20 ENCOUNTER — TELEPHONE (OUTPATIENT)
Dept: HEMATOLOGY/ONCOLOGY | Facility: CLINIC | Age: 65
End: 2020-07-20

## 2020-07-20 NOTE — TELEPHONE ENCOUNTER
Message fwd to .         ----- Message from Lola Andrea sent at 7/20/2020  7:55 AM CDT -----  Regarding: missed appointments  Type:  Needs Medical Advice    Who Called: pt  Advice Regarding: reschedule missed appointments  Would the patient rather a call back or a response via [facility]? call  Best Call Back Number: [phone]  Additional Information: n/a

## 2020-07-20 NOTE — TELEPHONE ENCOUNTER
----- Message from Ivania Aviles sent at 7/20/2020  8:14 AM CDT -----  Regarding: FW: missed appointments    ----- Message -----  From: Lola Andrea  Sent: 7/20/2020   7:55 AM CDT  To: Lv JIMÉNEZ Staff  Subject: missed appointments                              Type:  Needs Medical Advice    Who Called: pt  Advice Regarding: reschedule missed appointments  Would the patient rather a call back or a response via [facility]? call  Best Call Back Number: [phone]  Additional Information: n/a
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
## 2020-07-30 ENCOUNTER — TELEPHONE (OUTPATIENT)
Dept: INTERVENTIONAL RADIOLOGY/VASCULAR | Facility: HOSPITAL | Age: 65
End: 2020-07-30

## 2020-07-30 ENCOUNTER — OFFICE VISIT (OUTPATIENT)
Dept: HEMATOLOGY/ONCOLOGY | Facility: CLINIC | Age: 65
End: 2020-07-30
Payer: MEDICARE

## 2020-07-30 ENCOUNTER — TELEPHONE (OUTPATIENT)
Dept: HEMATOLOGY/ONCOLOGY | Facility: CLINIC | Age: 65
End: 2020-07-30

## 2020-07-30 VITALS
HEIGHT: 60 IN | SYSTOLIC BLOOD PRESSURE: 102 MMHG | WEIGHT: 149.69 LBS | DIASTOLIC BLOOD PRESSURE: 73 MMHG | HEART RATE: 99 BPM | RESPIRATION RATE: 16 BRPM | TEMPERATURE: 99 F | OXYGEN SATURATION: 98 % | BODY MASS INDEX: 29.39 KG/M2

## 2020-07-30 DIAGNOSIS — R18.8 OTHER ASCITES: ICD-10-CM

## 2020-07-30 DIAGNOSIS — C22.0 HCC (HEPATOCELLULAR CARCINOMA): Primary | ICD-10-CM

## 2020-07-30 PROCEDURE — 99999 PR PBB SHADOW E&M-EST. PATIENT-LVL III: CPT | Mod: PBBFAC,GC,,

## 2020-07-30 PROCEDURE — 99215 PR OFFICE/OUTPT VISIT, EST, LEVL V, 40-54 MIN: ICD-10-PCS | Mod: S$PBB,GC,,

## 2020-07-30 PROCEDURE — 99213 OFFICE O/P EST LOW 20 MIN: CPT | Mod: PBBFAC

## 2020-07-30 PROCEDURE — 99999 PR PBB SHADOW E&M-EST. PATIENT-LVL III: ICD-10-PCS | Mod: PBBFAC,GC,,

## 2020-07-30 PROCEDURE — 99215 OFFICE O/P EST HI 40 MIN: CPT | Mod: S$PBB,GC,,

## 2020-07-30 RX ORDER — UMECLIDINIUM BROMIDE AND VILANTEROL TRIFENATATE 62.5; 25 UG/1; UG/1
POWDER RESPIRATORY (INHALATION)
COMMUNITY
Start: 2020-07-14

## 2020-07-30 NOTE — Clinical Note
Please make appointment with Dr. Elizabeth or Dr. Mauricio on the [facility] ASAP  Please make sure IR appointments for paracentesis are scheduled weekly

## 2020-07-30 NOTE — TELEPHONE ENCOUNTER
----- Message from Ivania Aviles sent at 7/30/2020  2:53 PM CDT -----  Wasn't sent high alert- needs para tomorrow and next week at [facility]!  ----- Message -----  From: Wilmar Yoon MD  Sent: 7/30/2020   2:02 PM CDT  To: Ivania Aviles    [facility]  She needs to have another paracentesis next week again to [facility].  She should return in 2 weeks to see Dr. Petty

## 2020-07-30 NOTE — Clinical Note
Patient to have a paracentesis either today or tomorrow at the [facility].  She needs to have another paracentesis next week again to [facility].  She should return in 2 weeks to see Dr. Petty

## 2020-07-30 NOTE — TELEPHONE ENCOUNTER
----- Message from Ivania Aviles sent at 7/30/2020  2:53 PM CDT -----  Wasn't sent high alert- needs para tomorrow and next week at [facility]!  ----- Message -----  From: Wilmar Yoon MD  Sent: 7/30/2020   2:02 PM CDT  To: Ivania Aviles    Patient to have a paracentesis either today or tomorrow at the [facility].  She needs to have another paracentesis next week again to [facility].  She should return in 2 weeks to see Dr. Petty

## 2020-07-31 ENCOUNTER — TELEPHONE (OUTPATIENT)
Dept: HEMATOLOGY/ONCOLOGY | Facility: CLINIC | Age: 65
End: 2020-07-31

## 2020-07-31 NOTE — TELEPHONE ENCOUNTER
----- Message from Ivania Aviles sent at 7/31/2020  1:11 PM CDT -----  Any luck getting in touch with pt for para appts?  ----- Message -----  From: Wilmar Yoon MD  Sent: 7/30/2020   2:02 PM CDT  To: Ivania Aviles    Patient to have a paracentesis either today or tomorrow at the [facility].  She needs to have another paracentesis next week again to [facility].  She should return in 2 weeks to see Dr. Petty

## 2020-08-03 ENCOUNTER — HOSPITAL ENCOUNTER (OUTPATIENT)
Dept: INTERVENTIONAL RADIOLOGY/VASCULAR | Facility: HOSPITAL | Age: 65
Discharge: HOME | End: 2020-08-03
Payer: MEDICARE

## 2020-08-03 ENCOUNTER — TELEPHONE (OUTPATIENT)
Dept: INTERVENTIONAL RADIOLOGY/VASCULAR | Facility: HOSPITAL | Age: 65
End: 2020-08-03
Payer: MEDICARE

## 2020-08-10 ENCOUNTER — TELEPHONE (OUTPATIENT)
Dept: HEMATOLOGY/ONCOLOGY | Facility: CLINIC | Age: 65
End: 2020-08-10

## 2020-08-10 NOTE — TELEPHONE ENCOUNTER
Referral received from previous treating physician stating patient lives on [facility] and would like to be treated at this location  Tc made to schedule appt   Message left for her to contact office to make appointment to change her care as her next treatment is due 20     TC to patient's sister to leave message er:appt  She informed nurse that patient  last week